# Patient Record
Sex: FEMALE | Race: OTHER | HISPANIC OR LATINO | URBAN - METROPOLITAN AREA
[De-identification: names, ages, dates, MRNs, and addresses within clinical notes are randomized per-mention and may not be internally consistent; named-entity substitution may affect disease eponyms.]

---

## 2021-06-07 ENCOUNTER — EMERGENCY (EMERGENCY)
Facility: HOSPITAL | Age: 86
LOS: 1 days | Discharge: HOME | End: 2021-06-07
Attending: EMERGENCY MEDICINE
Payer: MEDICARE

## 2021-06-07 VITALS
HEART RATE: 64 BPM | TEMPERATURE: 98 F | DIASTOLIC BLOOD PRESSURE: 70 MMHG | SYSTOLIC BLOOD PRESSURE: 144 MMHG | OXYGEN SATURATION: 95 % | RESPIRATION RATE: 18 BRPM

## 2021-06-07 VITALS
OXYGEN SATURATION: 98 % | SYSTOLIC BLOOD PRESSURE: 156 MMHG | DIASTOLIC BLOOD PRESSURE: 82 MMHG | TEMPERATURE: 97 F | RESPIRATION RATE: 15 BRPM | HEART RATE: 66 BPM

## 2021-06-07 LAB
ALBUMIN SERPL ELPH-MCNC: 3.9 G/DL — SIGNIFICANT CHANGE UP (ref 3.3–5.2)
ALP SERPL-CCNC: 65 U/L — SIGNIFICANT CHANGE UP (ref 40–120)
ALT FLD-CCNC: 14 U/L — SIGNIFICANT CHANGE UP
ANION GAP SERPL CALC-SCNC: 8 MMOL/L — SIGNIFICANT CHANGE UP (ref 5–17)
APPEARANCE UR: ABNORMAL
AST SERPL-CCNC: 20 U/L — SIGNIFICANT CHANGE UP
BACTERIA # UR AUTO: ABNORMAL
BASOPHILS # BLD AUTO: 0.01 K/UL — SIGNIFICANT CHANGE UP (ref 0–0.2)
BASOPHILS NFR BLD AUTO: 0.2 % — SIGNIFICANT CHANGE UP (ref 0–2)
BILIRUB SERPL-MCNC: 0.5 MG/DL — SIGNIFICANT CHANGE UP (ref 0.4–2)
BILIRUB UR-MCNC: NEGATIVE — SIGNIFICANT CHANGE UP
BUN SERPL-MCNC: 25.1 MG/DL — HIGH (ref 8–20)
CALCIUM SERPL-MCNC: 9.2 MG/DL — SIGNIFICANT CHANGE UP (ref 8.6–10.2)
CHLORIDE SERPL-SCNC: 106 MMOL/L — SIGNIFICANT CHANGE UP (ref 98–107)
CO2 SERPL-SCNC: 27 MMOL/L — SIGNIFICANT CHANGE UP (ref 22–29)
COLOR SPEC: ABNORMAL
CREAT SERPL-MCNC: 0.46 MG/DL — LOW (ref 0.5–1.3)
DIFF PNL FLD: ABNORMAL
EOSINOPHIL # BLD AUTO: 0 K/UL — SIGNIFICANT CHANGE UP (ref 0–0.5)
EOSINOPHIL NFR BLD AUTO: 0 % — SIGNIFICANT CHANGE UP (ref 0–6)
EPI CELLS # UR: SIGNIFICANT CHANGE UP
GLUCOSE SERPL-MCNC: 120 MG/DL — HIGH (ref 70–99)
GLUCOSE UR QL: NEGATIVE — SIGNIFICANT CHANGE UP
HCT VFR BLD CALC: 28.3 % — LOW (ref 34.5–45)
HCT VFR BLD CALC: 29.3 % — LOW (ref 34.5–45)
HGB BLD-MCNC: 8.4 G/DL — LOW (ref 11.5–15.5)
HGB BLD-MCNC: 8.8 G/DL — LOW (ref 11.5–15.5)
IMM GRANULOCYTES NFR BLD AUTO: 0.3 % — SIGNIFICANT CHANGE UP (ref 0–1.5)
KETONES UR-MCNC: ABNORMAL
LEUKOCYTE ESTERASE UR-ACNC: ABNORMAL
LYMPHOCYTES # BLD AUTO: 0.63 K/UL — LOW (ref 1–3.3)
LYMPHOCYTES # BLD AUTO: 10.8 % — LOW (ref 13–44)
MCHC RBC-ENTMCNC: 26.8 PG — LOW (ref 27–34)
MCHC RBC-ENTMCNC: 27.2 PG — SIGNIFICANT CHANGE UP (ref 27–34)
MCHC RBC-ENTMCNC: 29.7 GM/DL — LOW (ref 32–36)
MCHC RBC-ENTMCNC: 30 GM/DL — LOW (ref 32–36)
MCV RBC AUTO: 90.4 FL — SIGNIFICANT CHANGE UP (ref 80–100)
MCV RBC AUTO: 90.4 FL — SIGNIFICANT CHANGE UP (ref 80–100)
MONOCYTES # BLD AUTO: 0.69 K/UL — SIGNIFICANT CHANGE UP (ref 0–0.9)
MONOCYTES NFR BLD AUTO: 11.9 % — SIGNIFICANT CHANGE UP (ref 2–14)
NEUTROPHILS # BLD AUTO: 4.46 K/UL — SIGNIFICANT CHANGE UP (ref 1.8–7.4)
NEUTROPHILS NFR BLD AUTO: 76.8 % — SIGNIFICANT CHANGE UP (ref 43–77)
NITRITE UR-MCNC: POSITIVE
OB PNL STL: NEGATIVE — SIGNIFICANT CHANGE UP
PH UR: 6.5 — SIGNIFICANT CHANGE UP (ref 5–8)
PLATELET # BLD AUTO: 191 K/UL — SIGNIFICANT CHANGE UP (ref 150–400)
PLATELET # BLD AUTO: 223 K/UL — SIGNIFICANT CHANGE UP (ref 150–400)
POTASSIUM SERPL-MCNC: 4.3 MMOL/L — SIGNIFICANT CHANGE UP (ref 3.5–5.3)
POTASSIUM SERPL-SCNC: 4.3 MMOL/L — SIGNIFICANT CHANGE UP (ref 3.5–5.3)
PROT SERPL-MCNC: 6.4 G/DL — LOW (ref 6.6–8.7)
PROT UR-MCNC: 500 MG/DL
RBC # BLD: 3.13 M/UL — LOW (ref 3.8–5.2)
RBC # BLD: 3.24 M/UL — LOW (ref 3.8–5.2)
RBC # FLD: 14.6 % — HIGH (ref 10.3–14.5)
RBC # FLD: 14.7 % — HIGH (ref 10.3–14.5)
RBC CASTS # UR COMP ASSIST: SIGNIFICANT CHANGE UP /HPF (ref 0–4)
SODIUM SERPL-SCNC: 141 MMOL/L — SIGNIFICANT CHANGE UP (ref 135–145)
SP GR SPEC: 1.01 — SIGNIFICANT CHANGE UP (ref 1.01–1.02)
UROBILINOGEN FLD QL: NEGATIVE — SIGNIFICANT CHANGE UP
WBC # BLD: 5.53 K/UL — SIGNIFICANT CHANGE UP (ref 3.8–10.5)
WBC # BLD: 5.81 K/UL — SIGNIFICANT CHANGE UP (ref 3.8–10.5)
WBC # FLD AUTO: 5.53 K/UL — SIGNIFICANT CHANGE UP (ref 3.8–10.5)
WBC # FLD AUTO: 5.81 K/UL — SIGNIFICANT CHANGE UP (ref 3.8–10.5)
WBC UR QL: ABNORMAL

## 2021-06-07 PROCEDURE — 99284 EMERGENCY DEPT VISIT MOD MDM: CPT | Mod: 25

## 2021-06-07 PROCEDURE — 85027 COMPLETE CBC AUTOMATED: CPT

## 2021-06-07 PROCEDURE — 81001 URINALYSIS AUTO W/SCOPE: CPT

## 2021-06-07 PROCEDURE — 87086 URINE CULTURE/COLONY COUNT: CPT

## 2021-06-07 PROCEDURE — 96374 THER/PROPH/DIAG INJ IV PUSH: CPT | Mod: XU

## 2021-06-07 PROCEDURE — G1004: CPT

## 2021-06-07 PROCEDURE — 36415 COLL VENOUS BLD VENIPUNCTURE: CPT

## 2021-06-07 PROCEDURE — 99284 EMERGENCY DEPT VISIT MOD MDM: CPT

## 2021-06-07 PROCEDURE — 80053 COMPREHEN METABOLIC PANEL: CPT

## 2021-06-07 PROCEDURE — 87186 SC STD MICRODIL/AGAR DIL: CPT

## 2021-06-07 PROCEDURE — 85025 COMPLETE CBC W/AUTO DIFF WBC: CPT

## 2021-06-07 PROCEDURE — 74177 CT ABD & PELVIS W/CONTRAST: CPT

## 2021-06-07 PROCEDURE — 82272 OCCULT BLD FECES 1-3 TESTS: CPT

## 2021-06-07 PROCEDURE — 74177 CT ABD & PELVIS W/CONTRAST: CPT | Mod: 26,ME

## 2021-06-07 RX ORDER — CEFTRIAXONE 500 MG/1
1000 INJECTION, POWDER, FOR SOLUTION INTRAMUSCULAR; INTRAVENOUS ONCE
Refills: 0 | Status: COMPLETED | OUTPATIENT
Start: 2021-06-07 | End: 2021-06-07

## 2021-06-07 RX ADMIN — CEFTRIAXONE 100 MILLIGRAM(S): 500 INJECTION, POWDER, FOR SOLUTION INTRAMUSCULAR; INTRAVENOUS at 12:35

## 2021-06-07 NOTE — ED PROVIDER NOTE - CLINICAL SUMMARY MEDICAL DECISION MAKING FREE TEXT BOX
89 y/o F presents for dysuria and hematuria x 1.5 days, afebrile, no CVAT, non-tender abdomen. Will check labs, UA/culture and reassess, ABx as necessary. If no UTI, will consider imaging for vaginal bleeding.

## 2021-06-07 NOTE — ED ADULT NURSE REASSESSMENT NOTE - NS ED NURSE REASSESS COMMENT FT1
Pt is alert and oriented. Pt denies sob, chest pain, nausea, vomiting and dizziness. Pt has some blood in her depends. Pt resp are even and unlabored, skin color nicki for race. pt educated on plan of care, pt able to successfully teach back plan of care to RN, RN will continue to reeducate pt during hospital stay.

## 2021-06-07 NOTE — ED PROVIDER NOTE - CARE PROVIDER_API CALL
Amish Guillen (DO)  Surgery  [address] [place] [address]  Phone: [phone]  Fax: [phone]  Follow Up Time: Urgent

## 2021-06-07 NOTE — ED PROVIDER NOTE - PATIENT PORTAL LINK FT
You can access the FollowMyHealth Patient Portal offered by [facility] by registering at the following website: [email or web address]. By joining [employer]’s FollowMyHealth portal, you will also be able to view your health information using other applications (apps) compatible with our system.

## 2021-06-07 NOTE — ED PROVIDER NOTE - CARE PLAN
Principal Discharge DX:	Acute cystitis with hematuria  Secondary Diagnosis:	Iron deficiency anemia due to chronic blood loss

## 2021-06-07 NOTE — ED PROVIDER NOTE - OBJECTIVE STATEMENT
89 y/o F presents complaining of 1.5 days of blood in urine and dysuria. She denies fevers, chills, back pain, urinary frequency or urgency. She does not take any blood thinners. She denies vaginal bleeding except when she is urinating. She notes mild decreased appetite. She notes chronic LE edema for which she takes a diuretic without improvement. She has never had anything like this before. She denies blood in stool. She lives with her . She denies allergies to medications.

## 2021-06-07 NOTE — ED PROVIDER NOTE - PHYSICAL EXAMINATION
Const: Awake, alert and oriented. In no acute distress. Well appearing.  HEENT: NC/AT. Moist mucous membranes.  Eyes: No scleral icterus. EOMI.  Neck:. Soft and supple. Full ROM without pain.  Cardiac: Regular rate and regular rhythm. +S1/S2. + murmur. Peripheral pulses 2+ and symmetric. 3+ b/l LE edema to the midthigh.  Resp: Speaking in full sentences. No evidence of respiratory distress. No wheezes, rales or rhonchi.  Abd: Soft, non-tender, non-distended. Normal bowel sounds in all 4 quadrants. No guarding or rebound.  : Normal external female genitalia, feces in groin area, no active vaginal bleeding at this time.  Back: Spine midline and non-tender. No CVAT.  Skin: No rashes, abrasions or lacerations.  Neuro: Awake, alert & oriented x 3. Moves all extremities symmetrically.

## 2021-06-07 NOTE — ED PROVIDER NOTE - PROGRESS NOTE DETAILS
Renetta: I discussed with son Irineo who notes that the patient has been complaining of weakness, but does not have a history of anemia. Son wants the patient to come home as quickly as possible. Recommends sending ABx to [facility] in [place]. I discussed the importance of follow up with PMD in 48 hours, and patient agrees. Citarrella: Patient noted to have john hematuria and now be mildly hypotensive. Citarrella: Patient noted to have john hematuria and now be mildly hypotensive. Patient's other son at bedside states that last week patient went to PMD who ordered blood work as medical clearance in preparation for receiving the Covid vaccine and she was found to be anemic and started on PO iron (3 days ago), but has no history of anemia. He denies that she looks pale. He is aware of need for repeat CBC and CT scan. Renetta: Patient CT results noted. Shared results with son and patient. Recommended urology follow up and discussed strict return precautions. Citarrella: Patient noted to have continued hematuria and now be mildly hypotensive. Patient's other son at bedside states that last week patient went to PMD who ordered blood work as medical clearance in preparation for receiving the Covid vaccine and she was found to be anemic and started on PO iron (3 days ago), but has no history of anemia. He denies that she looks pale. He is aware of need for repeat CBC and CT scan.

## 2021-06-07 NOTE — ED ADULT TRIAGE NOTE - CHIEF COMPLAINT QUOTE
pt a+ox3, BIBA from home c/o vaginal bleeding and pain while urinating x2 days. pt denies any abd pain, n/v/d, fever, chills.

## 2021-06-09 RX ORDER — CEPHALEXIN 500 MG
1 CAPSULE ORAL
Qty: 14 | Refills: 0
Start: 2021-06-09 | End: 2021-06-15

## 2021-09-30 ENCOUNTER — APPOINTMENT (OUTPATIENT)
Dept: VASCULAR SURGERY | Facility: CLINIC | Age: 86
End: 2021-09-30
Payer: MEDICARE

## 2021-09-30 VITALS
OXYGEN SATURATION: 96 % | TEMPERATURE: 97.4 F | HEART RATE: 65 BPM | SYSTOLIC BLOOD PRESSURE: 112 MMHG | DIASTOLIC BLOOD PRESSURE: 66 MMHG

## 2021-09-30 DIAGNOSIS — I87.2 VENOUS INSUFFICIENCY (CHRONIC) (PERIPHERAL): ICD-10-CM

## 2021-09-30 DIAGNOSIS — M79.89 OTHER SPECIFIED SOFT TISSUE DISORDERS: ICD-10-CM

## 2021-09-30 PROCEDURE — 99203 OFFICE O/P NEW LOW 30 MIN: CPT

## 2021-09-30 PROCEDURE — 93970 EXTREMITY STUDY: CPT

## 2021-09-30 NOTE — HISTORY OF PRESENT ILLNESS
[FreeTextEntry1] : 89 year old female presents for evaluation of bilateral lower extremity swelling.\par She reports that over the last 2 years, both of her legs have become increasingly swollen and occasionally weep. Symptoms worsened last month but have improved since her PCP stated her on diuretics.\par Patient denies previous DVTs. No fever or chills\par Of note, she is unable to walk because of a previous stroke.

## 2021-09-30 NOTE — PHYSICAL EXAM
[Normal Breath Sounds] : Normal breath sounds [2+] : left 2+ [Ankle Swelling (On Exam)] : present [] : present [Ankle Swelling On The Left] : moderate [No Rash or Lesion] : No rash or lesion [Alert] : alert [Oriented to Person] : oriented to person [Oriented to Place] : oriented to place [Calm] : calm [JVD] : no jugular venous distention  [Varicose Veins Of Lower Extremities] : not present [Abdomen Masses] : No abdominal masses [Abdomen Tenderness] : ~T ~M No abdominal tenderness [de-identified] : Well appearing [de-identified] : NCAT [FreeTextEntry1] : left leg hyperpigmentation and edema. No lymphorrhea or cellulitis

## 2021-09-30 NOTE — ASSESSMENT
[FreeTextEntry1] : 89 year old female with bilateral lower extremity edema, worse in the left leg.\par Venous ultrasound performed today shows no acute DVT. he has chronic venous changes and deep reflux in both femoral veins suggestive of previous DVT. Furthermore, she has pulsatile venous waveforms suggestive of underlying CHF.\par Her leg swelling is therefore likely due to a combination of deep venous insufficiency and CHF.\par -I have applied bilateral leg Unna boots. Will transition to knee high, 20-30 mmHg compression stockings when edema is less\par -As there is no acute DVT, she does not require therapeutic anticoagulation\par -For her possible CHF, I have recommended a cardiology consultation. Her son informs me that she is scheduled to see a cardiologist\par -RTO 1 week

## 2022-01-01 ENCOUNTER — TRANSCRIPTION ENCOUNTER (OUTPATIENT)
Age: 87
End: 2022-01-01

## 2022-01-01 ENCOUNTER — EMERGENCY (EMERGENCY)
Facility: HOSPITAL | Age: 87
LOS: 1 days | Discharge: HOME | End: 2022-01-01
Attending: STUDENT IN AN ORGANIZED HEALTH CARE EDUCATION/TRAINING PROGRAM
Payer: MEDICARE

## 2022-01-01 ENCOUNTER — INPATIENT (INPATIENT)
Facility: HOSPITAL | Age: 87
LOS: 2 days | Discharge: HOSPICE | DRG: 258 | End: 2022-12-22
Attending: INTERNAL MEDICINE | Admitting: EMERGENCY MEDICINE
Payer: MEDICARE

## 2022-01-01 VITALS
WEIGHT: 130.07 LBS | RESPIRATION RATE: 18 BRPM | OXYGEN SATURATION: 94 % | HEIGHT: 57 IN | HEART RATE: 79 BPM | DIASTOLIC BLOOD PRESSURE: 70 MMHG | TEMPERATURE: 99 F | SYSTOLIC BLOOD PRESSURE: 133 MMHG

## 2022-01-01 VITALS — WEIGHT: 115.08 LBS | HEIGHT: 59 IN

## 2022-01-01 VITALS — TEMPERATURE: 97 F

## 2022-01-01 DIAGNOSIS — I27.20 PULMONARY HYPERTENSION, UNSPECIFIED: ICD-10-CM

## 2022-01-01 DIAGNOSIS — R73.9 HYPERGLYCEMIA, UNSPECIFIED: ICD-10-CM

## 2022-01-01 DIAGNOSIS — I50.9 HEART FAILURE, UNSPECIFIED: ICD-10-CM

## 2022-01-01 DIAGNOSIS — I44.2 ATRIOVENTRICULAR BLOCK, COMPLETE: ICD-10-CM

## 2022-01-01 DIAGNOSIS — Z45.010 ENCOUNTER FOR CHECKING AND TESTING OF CARDIAC PACEMAKER PULSE GENERATOR [BATTERY]: ICD-10-CM

## 2022-01-01 DIAGNOSIS — Z99.3 DEPENDENCE ON WHEELCHAIR: ICD-10-CM

## 2022-01-01 DIAGNOSIS — Z90.710 ACQUIRED ABSENCE OF BOTH CERVIX AND UTERUS: ICD-10-CM

## 2022-01-01 DIAGNOSIS — I69.951 HEMIPLEGIA AND HEMIPARESIS FOLLOWING UNSPECIFIED CEREBROVASCULAR DISEASE AFFECTING RIGHT DOMINANT SIDE: ICD-10-CM

## 2022-01-01 DIAGNOSIS — I50.33 ACUTE ON CHRONIC DIASTOLIC (CONGESTIVE) HEART FAILURE: ICD-10-CM

## 2022-01-01 DIAGNOSIS — R54 AGE-RELATED PHYSICAL DEBILITY: ICD-10-CM

## 2022-01-01 DIAGNOSIS — R62.7 ADULT FAILURE TO THRIVE: ICD-10-CM
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
## 2022-01-01 DIAGNOSIS — Z90.710 ACQUIRED ABSENCE OF BOTH CERVIX AND UTERUS: Chronic | ICD-10-CM

## 2022-01-01 DIAGNOSIS — R00.1 BRADYCARDIA, UNSPECIFIED: ICD-10-CM

## 2022-01-01 DIAGNOSIS — E43 UNSPECIFIED SEVERE PROTEIN-CALORIE MALNUTRITION: ICD-10-CM

## 2022-01-01 DIAGNOSIS — Z66 DO NOT RESUSCITATE: ICD-10-CM

## 2022-01-01 DIAGNOSIS — Z20.822 CONTACT WITH AND (SUSPECTED) EXPOSURE TO COVID-19: ICD-10-CM

## 2022-01-01 DIAGNOSIS — I50.31 ACUTE DIASTOLIC (CONGESTIVE) HEART FAILURE: ICD-10-CM

## 2022-01-01 DIAGNOSIS — I69.920 APHASIA FOLLOWING UNSPECIFIED CEREBROVASCULAR DISEASE: ICD-10-CM

## 2022-01-01 DIAGNOSIS — Z85.42 PERSONAL HISTORY OF MALIGNANT NEOPLASM OF OTHER PARTS OF UTERUS: ICD-10-CM

## 2022-01-01 DIAGNOSIS — E88.09 OTHER DISORDERS OF PLASMA-PROTEIN METABOLISM, NOT ELSEWHERE CLASSIFIED: ICD-10-CM

## 2022-01-01 DIAGNOSIS — J96.01 ACUTE RESPIRATORY FAILURE WITH HYPOXIA: ICD-10-CM

## 2022-01-01 DIAGNOSIS — D53.9 NUTRITIONAL ANEMIA, UNSPECIFIED: ICD-10-CM

## 2022-01-01 DIAGNOSIS — Z71.89 OTHER SPECIFIED COUNSELING: ICD-10-CM

## 2022-01-01 DIAGNOSIS — Z95.0 PRESENCE OF CARDIAC PACEMAKER: ICD-10-CM
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
## 2022-01-01 LAB
ADD ON TEST-SPECIMEN IN LAB: SIGNIFICANT CHANGE UP
ADD ON TEST-SPECIMEN IN LAB: SIGNIFICANT CHANGE UP
ALBUMIN SERPL ELPH-MCNC: 2.6 G/DL — LOW (ref 3.3–5)
ALP SERPL-CCNC: 117 U/L — SIGNIFICANT CHANGE UP (ref 40–120)
ALT FLD-CCNC: 27 U/L — SIGNIFICANT CHANGE UP (ref 12–78)
ANION GAP SERPL CALC-SCNC: 3 MMOL/L — LOW (ref 5–17)
ANION GAP SERPL CALC-SCNC: 3 MMOL/L — LOW (ref 5–17)
ANION GAP SERPL CALC-SCNC: 7 MMOL/L — SIGNIFICANT CHANGE UP (ref 5–17)
ANION GAP SERPL CALC-SCNC: 9 MMOL/L — SIGNIFICANT CHANGE UP (ref 5–17)
APTT BLD: 30.1 SEC — SIGNIFICANT CHANGE UP (ref 27.5–35.5)
APTT BLD: 31.1 SEC — SIGNIFICANT CHANGE UP (ref 27.5–35.5)
AST SERPL-CCNC: 36 U/L — SIGNIFICANT CHANGE UP (ref 15–37)
BASE EXCESS BLDV CALC-SCNC: 4.2 MMOL/L — SIGNIFICANT CHANGE UP
BASOPHILS # BLD AUTO: 0 K/UL — SIGNIFICANT CHANGE UP (ref 0–0.2)
BASOPHILS # BLD AUTO: 0 K/UL — SIGNIFICANT CHANGE UP (ref 0–0.2)
BASOPHILS # BLD AUTO: 0.04 K/UL — SIGNIFICANT CHANGE UP (ref 0–0.2)
BASOPHILS NFR BLD AUTO: 0 % — SIGNIFICANT CHANGE UP (ref 0–2)
BASOPHILS NFR BLD AUTO: 0 % — SIGNIFICANT CHANGE UP (ref 0–2)
BASOPHILS NFR BLD AUTO: 0.5 % — SIGNIFICANT CHANGE UP (ref 0–2)
BILIRUB SERPL-MCNC: 0.9 MG/DL — SIGNIFICANT CHANGE UP (ref 0.2–1.2)
BUN SERPL-MCNC: 26 MG/DL — HIGH (ref 7–23)
BUN SERPL-MCNC: 30 MG/DL — HIGH (ref 7–23)
BUN SERPL-MCNC: 35 MG/DL — HIGH (ref 7–23)
BUN SERPL-MCNC: 43 MG/DL — HIGH (ref 7–23)
CALCIUM SERPL-MCNC: 8.7 MG/DL — SIGNIFICANT CHANGE UP (ref 8.5–10.1)
CALCIUM SERPL-MCNC: 8.7 MG/DL — SIGNIFICANT CHANGE UP (ref 8.5–10.1)
CALCIUM SERPL-MCNC: 9.1 MG/DL — SIGNIFICANT CHANGE UP (ref 8.5–10.1)
CALCIUM SERPL-MCNC: 9.3 MG/DL — SIGNIFICANT CHANGE UP (ref 8.5–10.1)
CHLORIDE SERPL-SCNC: 104 MMOL/L — SIGNIFICANT CHANGE UP (ref 96–108)
CHLORIDE SERPL-SCNC: 105 MMOL/L — SIGNIFICANT CHANGE UP (ref 96–108)
CHLORIDE SERPL-SCNC: 105 MMOL/L — SIGNIFICANT CHANGE UP (ref 96–108)
CHLORIDE SERPL-SCNC: 107 MMOL/L — SIGNIFICANT CHANGE UP (ref 96–108)
CHOLEST SERPL-MCNC: 150 MG/DL — SIGNIFICANT CHANGE UP
CO2 BLDV-SCNC: 33 MMOL/L — HIGH (ref 22–26)
CO2 SERPL-SCNC: 28 MMOL/L — SIGNIFICANT CHANGE UP (ref 22–31)
CO2 SERPL-SCNC: 31 MMOL/L — SIGNIFICANT CHANGE UP (ref 22–31)
CO2 SERPL-SCNC: 32 MMOL/L — HIGH (ref 22–31)
CO2 SERPL-SCNC: 34 MMOL/L — HIGH (ref 22–31)
CREAT SERPL-MCNC: 0.48 MG/DL — LOW (ref 0.5–1.3)
CREAT SERPL-MCNC: 0.63 MG/DL — SIGNIFICANT CHANGE UP (ref 0.5–1.3)
CREAT SERPL-MCNC: 0.78 MG/DL — SIGNIFICANT CHANGE UP (ref 0.5–1.3)
CREAT SERPL-MCNC: 0.81 MG/DL — SIGNIFICANT CHANGE UP (ref 0.5–1.3)
CULTURE RESULTS: SIGNIFICANT CHANGE UP
CULTURE RESULTS: SIGNIFICANT CHANGE UP
EGFR: 66 ML/MIN/1.73M2 — SIGNIFICANT CHANGE UP
EGFR: 69 ML/MIN/1.73M2 — SIGNIFICANT CHANGE UP
EGFR: 81 ML/MIN/1.73M2 — SIGNIFICANT CHANGE UP
EGFR: 86 ML/MIN/1.73M2 — SIGNIFICANT CHANGE UP
EOSINOPHIL # BLD AUTO: 0 K/UL — SIGNIFICANT CHANGE UP (ref 0–0.5)
EOSINOPHIL NFR BLD AUTO: 0 % — SIGNIFICANT CHANGE UP (ref 0–6)
FOLATE SERPL-MCNC: >20 NG/ML — SIGNIFICANT CHANGE UP
GLUCOSE SERPL-MCNC: 111 MG/DL — HIGH (ref 70–99)
GLUCOSE SERPL-MCNC: 115 MG/DL — HIGH (ref 70–99)
GLUCOSE SERPL-MCNC: 126 MG/DL — HIGH (ref 70–99)
GLUCOSE SERPL-MCNC: 86 MG/DL — SIGNIFICANT CHANGE UP (ref 70–99)
HCO3 BLDV-SCNC: 31 MMOL/L — HIGH (ref 22–29)
HCT VFR BLD CALC: 28.6 % — LOW (ref 34.5–45)
HCT VFR BLD CALC: 29.8 % — LOW (ref 34.5–45)
HCT VFR BLD CALC: 30 % — LOW (ref 34.5–45)
HCT VFR BLD CALC: 30.7 % — LOW (ref 34.5–45)
HDLC SERPL-MCNC: 52 MG/DL — SIGNIFICANT CHANGE UP
HGB BLD-MCNC: 8.9 G/DL — LOW (ref 11.5–15.5)
HGB BLD-MCNC: 9 G/DL — LOW (ref 11.5–15.5)
HGB BLD-MCNC: 9.4 G/DL — LOW (ref 11.5–15.5)
HGB BLD-MCNC: 9.7 G/DL — LOW (ref 11.5–15.5)
IMM GRANULOCYTES NFR BLD AUTO: 0.4 % — SIGNIFICANT CHANGE UP (ref 0–0.9)
INR BLD: 1.18 RATIO — HIGH (ref 0.88–1.16)
INR BLD: 1.21 RATIO — HIGH (ref 0.88–1.16)
LACTATE SERPL-SCNC: 1.5 MMOL/L — SIGNIFICANT CHANGE UP (ref 0.7–2)
LIPID PNL WITH DIRECT LDL SERPL: 76 MG/DL — SIGNIFICANT CHANGE UP
LYMPHOCYTES # BLD AUTO: 0.34 K/UL — LOW (ref 1–3.3)
LYMPHOCYTES # BLD AUTO: 0.5 K/UL — LOW (ref 1–3.3)
LYMPHOCYTES # BLD AUTO: 0.67 K/UL — LOW (ref 1–3.3)
LYMPHOCYTES # BLD AUTO: 4.3 % — LOW (ref 13–44)
LYMPHOCYTES # BLD AUTO: 5 % — LOW (ref 13–44)
LYMPHOCYTES # BLD AUTO: 8 % — LOW (ref 13–44)
MAGNESIUM SERPL-MCNC: 2.3 MG/DL — SIGNIFICANT CHANGE UP (ref 1.6–2.6)
MAGNESIUM SERPL-MCNC: 2.4 MG/DL — SIGNIFICANT CHANGE UP (ref 1.6–2.6)
MCHC RBC-ENTMCNC: 30.2 GM/DL — LOW (ref 32–36)
MCHC RBC-ENTMCNC: 31 PG — SIGNIFICANT CHANGE UP (ref 27–34)
MCHC RBC-ENTMCNC: 31.1 GM/DL — LOW (ref 32–36)
MCHC RBC-ENTMCNC: 31.3 GM/DL — LOW (ref 32–36)
MCHC RBC-ENTMCNC: 31.4 PG — SIGNIFICANT CHANGE UP (ref 27–34)
MCHC RBC-ENTMCNC: 31.6 GM/DL — LOW (ref 32–36)
MCHC RBC-ENTMCNC: 31.6 PG — SIGNIFICANT CHANGE UP (ref 27–34)
MCHC RBC-ENTMCNC: 32 PG — SIGNIFICANT CHANGE UP (ref 27–34)
MCV RBC AUTO: 101 FL — HIGH (ref 80–100)
MCV RBC AUTO: 101.1 FL — HIGH (ref 80–100)
MCV RBC AUTO: 101.3 FL — HIGH (ref 80–100)
MCV RBC AUTO: 102.8 FL — HIGH (ref 80–100)
MONOCYTES # BLD AUTO: 0.44 K/UL — SIGNIFICANT CHANGE UP (ref 0–0.9)
MONOCYTES # BLD AUTO: 0.94 K/UL — HIGH (ref 0–0.9)
MONOCYTES # BLD AUTO: 0.99 K/UL — HIGH (ref 0–0.9)
MONOCYTES NFR BLD AUTO: 12.5 % — SIGNIFICANT CHANGE UP (ref 2–14)
MONOCYTES NFR BLD AUTO: 7 % — SIGNIFICANT CHANGE UP (ref 2–14)
MONOCYTES NFR BLD AUTO: 7 % — SIGNIFICANT CHANGE UP (ref 2–14)
NEUTROPHILS # BLD AUTO: 11.42 K/UL — HIGH (ref 1.8–7.4)
NEUTROPHILS # BLD AUTO: 5.29 K/UL — SIGNIFICANT CHANGE UP (ref 1.8–7.4)
NEUTROPHILS # BLD AUTO: 6.52 K/UL — SIGNIFICANT CHANGE UP (ref 1.8–7.4)
NEUTROPHILS NFR BLD AUTO: 74 % — SIGNIFICANT CHANGE UP (ref 43–77)
NEUTROPHILS NFR BLD AUTO: 77 % — SIGNIFICANT CHANGE UP (ref 43–77)
NEUTROPHILS NFR BLD AUTO: 82.3 % — HIGH (ref 43–77)
NON HDL CHOLESTEROL: 98 MG/DL — SIGNIFICANT CHANGE UP
NRBC # BLD: SIGNIFICANT CHANGE UP /100 WBCS (ref 0–0)
NRBC # BLD: SIGNIFICANT CHANGE UP /100 WBCS (ref 0–0)
NT-PROBNP SERPL-SCNC: 6801 PG/ML — HIGH (ref 0–450)
PCO2 BLDV: 55 MMHG — HIGH (ref 39–42)
PH BLDV: 7.36 — SIGNIFICANT CHANGE UP (ref 7.32–7.43)
PHOSPHATE SERPL-MCNC: 3 MG/DL — SIGNIFICANT CHANGE UP (ref 2.5–4.5)
PHOSPHATE SERPL-MCNC: 3.6 MG/DL — SIGNIFICANT CHANGE UP (ref 2.5–4.5)
PLATELET # BLD AUTO: 171 K/UL — SIGNIFICANT CHANGE UP (ref 150–400)
PLATELET # BLD AUTO: 194 K/UL — SIGNIFICANT CHANGE UP (ref 150–400)
PLATELET # BLD AUTO: 204 K/UL — SIGNIFICANT CHANGE UP (ref 150–400)
PLATELET # BLD AUTO: 258 K/UL — SIGNIFICANT CHANGE UP (ref 150–400)
PO2 BLDV: 76 MMHG — SIGNIFICANT CHANGE UP
POTASSIUM SERPL-MCNC: 3.8 MMOL/L — SIGNIFICANT CHANGE UP (ref 3.5–5.3)
POTASSIUM SERPL-MCNC: 3.9 MMOL/L — SIGNIFICANT CHANGE UP (ref 3.5–5.3)
POTASSIUM SERPL-MCNC: 4 MMOL/L — SIGNIFICANT CHANGE UP (ref 3.5–5.3)
POTASSIUM SERPL-MCNC: 4.1 MMOL/L — SIGNIFICANT CHANGE UP (ref 3.5–5.3)
POTASSIUM SERPL-SCNC: 3.8 MMOL/L — SIGNIFICANT CHANGE UP (ref 3.5–5.3)
POTASSIUM SERPL-SCNC: 3.9 MMOL/L — SIGNIFICANT CHANGE UP (ref 3.5–5.3)
POTASSIUM SERPL-SCNC: 4 MMOL/L — SIGNIFICANT CHANGE UP (ref 3.5–5.3)
POTASSIUM SERPL-SCNC: 4.1 MMOL/L — SIGNIFICANT CHANGE UP (ref 3.5–5.3)
PROT SERPL-MCNC: 6.7 GM/DL — SIGNIFICANT CHANGE UP (ref 6–8.3)
PROTHROM AB SERPL-ACNC: 13.7 SEC — HIGH (ref 10.5–13.4)
PROTHROM AB SERPL-ACNC: 14.1 SEC — HIGH (ref 10.5–13.4)
RAPID RVP RESULT: SIGNIFICANT CHANGE UP
RBC # BLD: 2.83 M/UL — LOW (ref 3.8–5.2)
RBC # BLD: 2.9 M/UL — LOW (ref 3.8–5.2)
RBC # BLD: 2.97 M/UL — LOW (ref 3.8–5.2)
RBC # BLD: 3.03 M/UL — LOW (ref 3.8–5.2)
RBC # FLD: 14.2 % — SIGNIFICANT CHANGE UP (ref 10.3–14.5)
RBC # FLD: 14.3 % — SIGNIFICANT CHANGE UP (ref 10.3–14.5)
RBC # FLD: 14.4 % — SIGNIFICANT CHANGE UP (ref 10.3–14.5)
RBC # FLD: 14.5 % — SIGNIFICANT CHANGE UP (ref 10.3–14.5)
SAO2 % BLDV: 97.1 % — SIGNIFICANT CHANGE UP
SARS-COV-2 RNA SPEC QL NAA+PROBE: SIGNIFICANT CHANGE UP
SODIUM SERPL-SCNC: 141 MMOL/L — SIGNIFICANT CHANGE UP (ref 135–145)
SODIUM SERPL-SCNC: 142 MMOL/L — SIGNIFICANT CHANGE UP (ref 135–145)
SODIUM SERPL-SCNC: 142 MMOL/L — SIGNIFICANT CHANGE UP (ref 135–145)
SODIUM SERPL-SCNC: 143 MMOL/L — SIGNIFICANT CHANGE UP (ref 135–145)
SPECIMEN SOURCE: SIGNIFICANT CHANGE UP
SPECIMEN SOURCE: SIGNIFICANT CHANGE UP
TRIGL SERPL-MCNC: 109 MG/DL — SIGNIFICANT CHANGE UP
TROPONIN I, HIGH SENSITIVITY RESULT: 34.91 NG/L — SIGNIFICANT CHANGE UP
TROPONIN I, HIGH SENSITIVITY RESULT: 40.84 NG/L — SIGNIFICANT CHANGE UP
TROPONIN I, HIGH SENSITIVITY RESULT: 51.53 NG/L — SIGNIFICANT CHANGE UP
VIT B12 SERPL-MCNC: >2000 PG/ML — HIGH (ref 232–1245)
WBC # BLD: 13.44 K/UL — HIGH (ref 3.8–10.5)
WBC # BLD: 6.22 K/UL — SIGNIFICANT CHANGE UP (ref 3.8–10.5)
WBC # BLD: 7.92 K/UL — SIGNIFICANT CHANGE UP (ref 3.8–10.5)
WBC # BLD: 9.23 K/UL — SIGNIFICANT CHANGE UP (ref 3.8–10.5)
WBC # FLD AUTO: 13.44 K/UL — HIGH (ref 3.8–10.5)
WBC # FLD AUTO: 6.22 K/UL — SIGNIFICANT CHANGE UP (ref 3.8–10.5)
WBC # FLD AUTO: 7.92 K/UL — SIGNIFICANT CHANGE UP (ref 3.8–10.5)
WBC # FLD AUTO: 9.23 K/UL — SIGNIFICANT CHANGE UP (ref 3.8–10.5)

## 2022-01-01 PROCEDURE — 82607 VITAMIN B-12: CPT

## 2022-01-01 PROCEDURE — 86901 BLOOD TYPING SEROLOGIC RH(D): CPT

## 2022-01-01 PROCEDURE — 92610 EVALUATE SWALLOWING FUNCTION: CPT | Mod: GN

## 2022-01-01 PROCEDURE — 99284 EMERGENCY DEPT VISIT MOD MDM: CPT | Mod: FS

## 2022-01-01 PROCEDURE — 93010 ELECTROCARDIOGRAM REPORT: CPT | Mod: 76

## 2022-01-01 PROCEDURE — 84484 ASSAY OF TROPONIN QUANT: CPT

## 2022-01-01 PROCEDURE — 82746 ASSAY OF FOLIC ACID SERUM: CPT

## 2022-01-01 PROCEDURE — 93971 EXTREMITY STUDY: CPT | Mod: 26,RT

## 2022-01-01 PROCEDURE — 71045 X-RAY EXAM CHEST 1 VIEW: CPT | Mod: 26

## 2022-01-01 PROCEDURE — 99232 SBSQ HOSP IP/OBS MODERATE 35: CPT

## 2022-01-01 PROCEDURE — 93306 TTE W/DOPPLER COMPLETE: CPT

## 2022-01-01 PROCEDURE — 99497 ADVNCD CARE PLAN 30 MIN: CPT | Mod: 25

## 2022-01-01 PROCEDURE — C1785: CPT

## 2022-01-01 PROCEDURE — 73552 X-RAY EXAM OF FEMUR 2/>: CPT

## 2022-01-01 PROCEDURE — 93971 EXTREMITY STUDY: CPT

## 2022-01-01 PROCEDURE — 99222 1ST HOSP IP/OBS MODERATE 55: CPT

## 2022-01-01 PROCEDURE — 99223 1ST HOSP IP/OBS HIGH 75: CPT

## 2022-01-01 PROCEDURE — 85610 PROTHROMBIN TIME: CPT

## 2022-01-01 PROCEDURE — 93010 ELECTROCARDIOGRAM REPORT: CPT

## 2022-01-01 PROCEDURE — 82962 GLUCOSE BLOOD TEST: CPT

## 2022-01-01 PROCEDURE — 86900 BLOOD TYPING SEROLOGIC ABO: CPT

## 2022-01-01 PROCEDURE — 92523 SPEECH SOUND LANG COMPREHEN: CPT | Mod: GN

## 2022-01-01 PROCEDURE — 93005 ELECTROCARDIOGRAM TRACING: CPT

## 2022-01-01 PROCEDURE — 97163 PT EVAL HIGH COMPLEX 45 MIN: CPT | Mod: GP

## 2022-01-01 PROCEDURE — 85730 THROMBOPLASTIN TIME PARTIAL: CPT

## 2022-01-01 PROCEDURE — 73502 X-RAY EXAM HIP UNI 2-3 VIEWS: CPT | Mod: 26,RT

## 2022-01-01 PROCEDURE — 83735 ASSAY OF MAGNESIUM: CPT

## 2022-01-01 PROCEDURE — 85025 COMPLETE CBC W/AUTO DIFF WBC: CPT

## 2022-01-01 PROCEDURE — 99284 EMERGENCY DEPT VISIT MOD MDM: CPT | Mod: 25

## 2022-01-01 PROCEDURE — 84100 ASSAY OF PHOSPHORUS: CPT

## 2022-01-01 PROCEDURE — 84145 PROCALCITONIN (PCT): CPT

## 2022-01-01 PROCEDURE — 97116 GAIT TRAINING THERAPY: CPT | Mod: GP

## 2022-01-01 PROCEDURE — 71045 X-RAY EXAM CHEST 1 VIEW: CPT

## 2022-01-01 PROCEDURE — 80061 LIPID PANEL: CPT

## 2022-01-01 PROCEDURE — 73502 X-RAY EXAM HIP UNI 2-3 VIEWS: CPT

## 2022-01-01 PROCEDURE — 80048 BASIC METABOLIC PNL TOTAL CA: CPT

## 2022-01-01 PROCEDURE — 86850 RBC ANTIBODY SCREEN: CPT

## 2022-01-01 PROCEDURE — 36415 COLL VENOUS BLD VENIPUNCTURE: CPT

## 2022-01-01 PROCEDURE — 85027 COMPLETE CBC AUTOMATED: CPT

## 2022-01-01 PROCEDURE — 99285 EMERGENCY DEPT VISIT HI MDM: CPT

## 2022-01-01 PROCEDURE — 99233 SBSQ HOSP IP/OBS HIGH 50: CPT

## 2022-01-01 PROCEDURE — 73552 X-RAY EXAM OF FEMUR 2/>: CPT | Mod: 26,RT

## 2022-01-01 PROCEDURE — 93306 TTE W/DOPPLER COMPLETE: CPT | Mod: 26

## 2022-01-01 PROCEDURE — 97530 THERAPEUTIC ACTIVITIES: CPT | Mod: GP

## 2022-01-01 RX ORDER — OMEGA-3 ACID ETHYL ESTERS 1 G
1 CAPSULE ORAL
Refills: 0 | Status: DISCONTINUED | OUTPATIENT
Start: 2022-01-01 | End: 2022-01-01

## 2022-01-01 RX ORDER — FERROUS SULFATE 325(65) MG
325 TABLET ORAL DAILY
Refills: 0 | Status: DISCONTINUED | OUTPATIENT
Start: 2022-01-01 | End: 2022-01-01

## 2022-01-01 RX ORDER — CEFAZOLIN SODIUM 1 G
1000 VIAL (EA) INJECTION
Refills: 0 | Status: COMPLETED | OUTPATIENT
Start: 2022-01-01 | End: 2022-01-01

## 2022-01-01 RX ORDER — CEPHALEXIN 500 MG
1 CAPSULE ORAL
Qty: 20 | Refills: 0
Start: 2022-01-01 | End: 2022-01-01

## 2022-01-01 RX ORDER — ASCORBIC ACID 60 MG
1 TABLET,CHEWABLE ORAL
Qty: 0 | Refills: 0 | DISCHARGE

## 2022-01-01 RX ORDER — FUROSEMIDE 40 MG
1 TABLET ORAL
Qty: 30 | Refills: 1
Start: 2022-01-01 | End: 2023-02-19

## 2022-01-01 RX ORDER — MAGNESIUM SULFATE 500 MG/ML
1 VIAL (ML) INJECTION ONCE
Refills: 0 | Status: COMPLETED | OUTPATIENT
Start: 2022-01-01 | End: 2022-01-01

## 2022-01-01 RX ORDER — CALCIUM CARBONATE 500(1250)
1 TABLET ORAL
Qty: 0 | Refills: 0 | DISCHARGE

## 2022-01-01 RX ORDER — ONDANSETRON 8 MG/1
4 TABLET, FILM COATED ORAL EVERY 8 HOURS
Refills: 0 | Status: DISCONTINUED | OUTPATIENT
Start: 2022-01-01 | End: 2022-01-01

## 2022-01-01 RX ORDER — LANOLIN ALCOHOL/MO/W.PET/CERES
1 CREAM (GRAM) TOPICAL
Qty: 0 | Refills: 0 | DISCHARGE
Start: 2022-01-01

## 2022-01-01 RX ORDER — CEPHALEXIN 500 MG
500 CAPSULE ORAL ONCE
Refills: 0 | Status: DISCONTINUED | OUTPATIENT
Start: 2022-01-01 | End: 2022-01-01

## 2022-01-01 RX ORDER — CHOLECALCIFEROL (VITAMIN D3) 125 MCG
1 CAPSULE ORAL
Qty: 0 | Refills: 0 | DISCHARGE

## 2022-01-01 RX ORDER — ACETAMINOPHEN 500 MG
2 TABLET ORAL
Qty: 0 | Refills: 0 | DISCHARGE

## 2022-01-01 RX ORDER — LANOLIN ALCOHOL/MO/W.PET/CERES
3 CREAM (GRAM) TOPICAL AT BEDTIME
Refills: 0 | Status: DISCONTINUED | OUTPATIENT
Start: 2022-01-01 | End: 2022-01-01

## 2022-01-01 RX ORDER — METOPROLOL TARTRATE 50 MG
12.5 TABLET ORAL EVERY 12 HOURS
Refills: 0 | Status: DISCONTINUED | OUTPATIENT
Start: 2022-01-01 | End: 2022-01-01

## 2022-01-01 RX ORDER — ACETAMINOPHEN 500 MG
650 TABLET ORAL EVERY 6 HOURS
Refills: 0 | Status: DISCONTINUED | OUTPATIENT
Start: 2022-01-01 | End: 2022-01-01

## 2022-01-01 RX ORDER — CHOLECALCIFEROL (VITAMIN D3) 125 MCG
1000 CAPSULE ORAL DAILY
Refills: 0 | Status: DISCONTINUED | OUTPATIENT
Start: 2022-01-01 | End: 2022-01-01

## 2022-01-01 RX ORDER — METOPROLOL TARTRATE 50 MG
0.5 TABLET ORAL
Qty: 30 | Refills: 1
Start: 2022-01-01 | End: 2023-02-19

## 2022-01-01 RX ORDER — UBIDECARENONE 100 MG
1 CAPSULE ORAL
Qty: 0 | Refills: 0 | DISCHARGE

## 2022-01-01 RX ORDER — FUROSEMIDE 40 MG
40 TABLET ORAL ONCE
Refills: 0 | Status: COMPLETED | OUTPATIENT
Start: 2022-01-01 | End: 2022-01-01

## 2022-01-01 RX ORDER — CEFAZOLIN SODIUM 1 G
1000 VIAL (EA) INJECTION EVERY 8 HOURS
Refills: 0 | Status: DISCONTINUED | OUTPATIENT
Start: 2022-01-01 | End: 2022-01-01

## 2022-01-01 RX ORDER — VITAMIN E 100 UNIT
1 CAPSULE ORAL
Qty: 0 | Refills: 0 | DISCHARGE

## 2022-01-01 RX ORDER — OMEGA-3 ACID ETHYL ESTERS 1 G
1 CAPSULE ORAL
Qty: 0 | Refills: 0 | DISCHARGE

## 2022-01-01 RX ORDER — ASPIRIN/CALCIUM CARB/MAGNESIUM 324 MG
81 TABLET ORAL ONCE
Refills: 0 | Status: COMPLETED | OUTPATIENT
Start: 2022-01-01 | End: 2022-01-01

## 2022-01-01 RX ORDER — ASCORBIC ACID 60 MG
500 TABLET,CHEWABLE ORAL DAILY
Refills: 0 | Status: DISCONTINUED | OUTPATIENT
Start: 2022-01-01 | End: 2022-01-01

## 2022-01-01 RX ORDER — CEFAZOLIN SODIUM 1 G
2000 VIAL (EA) INJECTION ONCE
Refills: 0 | Status: COMPLETED | OUTPATIENT
Start: 2022-01-01 | End: 2022-01-01

## 2022-01-01 RX ORDER — FUROSEMIDE 40 MG
20 TABLET ORAL DAILY
Refills: 0 | Status: DISCONTINUED | OUTPATIENT
Start: 2022-01-01 | End: 2022-01-01

## 2022-01-01 RX ORDER — FERROUS SULFATE 325(65) MG
1 TABLET ORAL
Qty: 0 | Refills: 0 | DISCHARGE

## 2022-01-01 RX ORDER — ENOXAPARIN SODIUM 100 MG/ML
40 INJECTION SUBCUTANEOUS EVERY 24 HOURS
Refills: 0 | Status: DISCONTINUED | OUTPATIENT
Start: 2022-01-01 | End: 2022-01-01

## 2022-01-01 RX ORDER — CALCIUM CARBONATE 500(1250)
1 TABLET ORAL DAILY
Refills: 0 | Status: DISCONTINUED | OUTPATIENT
Start: 2022-01-01 | End: 2022-01-01

## 2022-01-01 RX ORDER — MORPHINE SULFATE 50 MG/1
1 CAPSULE, EXTENDED RELEASE ORAL EVERY 6 HOURS
Refills: 0 | Status: DISCONTINUED | OUTPATIENT
Start: 2022-01-01 | End: 2022-01-01

## 2022-01-01 RX ORDER — POTASSIUM CHLORIDE 20 MEQ
20 PACKET (EA) ORAL ONCE
Refills: 0 | Status: COMPLETED | OUTPATIENT
Start: 2022-01-01 | End: 2022-01-01

## 2022-01-01 RX ADMIN — ENOXAPARIN SODIUM 40 MILLIGRAM(S): 100 INJECTION SUBCUTANEOUS at 05:41

## 2022-01-01 RX ADMIN — Medication 1000 UNIT(S): at 11:31

## 2022-01-01 RX ADMIN — Medication 1 TABLET(S): at 11:30

## 2022-01-01 RX ADMIN — Medication 500 MILLIGRAM(S): at 10:26

## 2022-01-01 RX ADMIN — Medication 12.5 MILLIGRAM(S): at 10:26

## 2022-01-01 RX ADMIN — Medication 500 MILLIGRAM(S): at 11:30

## 2022-01-01 RX ADMIN — Medication 40 MILLIGRAM(S): at 16:30

## 2022-01-01 RX ADMIN — Medication 1000 UNIT(S): at 12:22

## 2022-01-01 RX ADMIN — Medication 20 MILLIGRAM(S): at 11:30

## 2022-01-01 RX ADMIN — Medication 325 MILLIGRAM(S): at 12:23

## 2022-01-01 RX ADMIN — Medication 1 GRAM(S): at 11:30

## 2022-01-01 RX ADMIN — Medication 40 MILLIGRAM(S): at 11:37

## 2022-01-01 RX ADMIN — Medication 325 MILLIGRAM(S): at 11:31

## 2022-01-01 RX ADMIN — Medication 81 MILLIGRAM(S): at 17:59

## 2022-01-01 RX ADMIN — Medication 1 TABLET(S): at 10:26

## 2022-01-01 RX ADMIN — Medication 1000 MILLIGRAM(S): at 03:22

## 2022-01-01 RX ADMIN — Medication 325 MILLIGRAM(S): at 10:27

## 2022-01-01 RX ADMIN — Medication 20 MILLIEQUIVALENT(S): at 22:23

## 2022-01-01 RX ADMIN — Medication 1 TABLET(S): at 12:22

## 2022-01-01 RX ADMIN — Medication 40 MILLIGRAM(S): at 18:45

## 2022-01-01 RX ADMIN — Medication 12.5 MILLIGRAM(S): at 11:29

## 2022-01-01 RX ADMIN — Medication 500 MILLIGRAM(S): at 12:22

## 2022-01-01 RX ADMIN — Medication 1000 UNIT(S): at 10:27

## 2022-01-01 RX ADMIN — Medication 1 GRAM(S): at 12:22

## 2022-01-01 RX ADMIN — Medication 100 GRAM(S): at 18:46

## 2022-01-01 RX ADMIN — Medication 1000 MILLIGRAM(S): at 10:26

## 2022-03-31 NOTE — ED PROVIDER NOTE - NS ED ROS FT
ROS: CONTUSIONAL: Denies fever, chills, fatigue, wt loss. HEAD: Denies trauma, HA, Dizziness. EYE: Denies Acute visual changes, diplopia. ENMT: Denies change in hearing, tinnitus, epistaxis, difficulty swallowing, sore throat. CARDIO: Denies CP, palpitations, edema. RESP: Denies Cough, SOB , Diff breathing, hemoptysis. GI: Denies N/V, ABD pain, change in bowel movement. URINARY: Denies difficulty urinating, pelvic pain. MS: leg ttp   Denies back pain, weakness, decreased ROM, swelling. NEURO: Denies change in gait, seizures, loss of sensation, dizziness, confusion LOC.  PSY: NO SI/HI. SKIN: +bruising  Denies Rash,

## 2022-03-31 NOTE — ED PROVIDER NOTE - NS ED ATTENDING STATEMENT MOD
This was a shared visit with the LEW. I reviewed and verified the documentation and independently performed the documented:

## 2022-03-31 NOTE — ED PROVIDER NOTE - ATTENDING CONTRIBUTION TO CARE
89 yo female sent in from her primary care doctor's office for pain to the posterior distal thigh that began a couple of days. Patient developed a hematoma s/p fall two weeks ago when her  was helping her transfer. Patient doing ok, however noted increase discomfort the past couple of days. No new trauma. I personally saw the patient with the PA, and completed the key components of the history and physical exam. I then discussed the management plan with the PA.

## 2022-03-31 NOTE — ED PROVIDER NOTE - PATIENT PORTAL LINK FT
You can access the FollowMyHealth Patient Portal offered by [facility] by registering at the following website: [email or web address]. By joining [employer]’s FollowMyHealth portal, you will also be able to view your health information using other applications (apps) compatible with our system.

## 2022-03-31 NOTE — ED PROVIDER NOTE - OBJECTIVE STATEMENT
PT with SPMHX of CVA, bed and wheelchair bound presents to the ED with complaint of large swollen area on the inner part of her Rt thigh. Pt accompanied by son who states that she fell out of bed 2 wk ago and was put back into bed and had a gradual onset of swelling over the RT leg. Pt states that she now has very swollen area that is tender and bruising and pain down the back of her leg. Pt states that pain fells like moderate soreness that is non radiating, constat, made worse with activity, improved by nothing. Dines fever, chills, weakness, numbness, tingling HA, dizziness, SOB, diff breathing.

## 2022-03-31 NOTE — ED PROVIDER NOTE - CLINICAL SUMMARY MEDICAL DECISION MAKING FREE TEXT BOX
PT with stable VS, no acute distress, non toxic appearing, tolerating PO in the ED, PT with no s/s of local infection, no s/s of expanding hematoma, no DVT, no acute FX, pt at baseline, Pt with large hematoma that will be tx supportively, cover with ABx to avoid superficial cellulites due to sensitivity of area, follow up to PCP, educated about when to return to the ED if needed. PT verbalizes that he understands all instructions and results. Pt informed that ED is open and available 24/7 365 days a yr, encouraged to return to the ED if they have any change in condition, or feel the need for revaluation.

## 2022-03-31 NOTE — ED PROVIDER NOTE - NSFOLLOWUPINSTRUCTIONS_ED_ALL_ED_FT
1) Follow up with your doctor in one week  2) Return to the ER for worsening or concerning symptoms  3) Take medication as prescribed      Hematoma    WHAT YOU NEED TO KNOW:    A hematoma is a collection of blood. A bruise is a type of hematoma. A hematoma may form in a muscle or in the tissues just under the skin. A hematoma that forms under the skin will feel like a bump or hard mass. Hematomas can happen anywhere in your body, including in your brain. Your body may break down and absorb a mild hematoma on its own. A more serious hematoma may need treatment.     DISCHARGE INSTRUCTIONS:    Medicines: You may need any of the following:   •Prescription pain medicine may be given. Ask how to take this medicine safely.      •NSAIDs, such as ibuprofen, help decrease swelling, pain, and fever. This medicine is available with or without a doctor's order. NSAIDs can cause stomach bleeding or kidney problems in certain people. If you take blood thinner medicine, always ask your healthcare provider if NSAIDs are safe for you. Always read the medicine label and follow directions.      •Antibiotics prevent or treat a bacterial infection.      •Take your medicine as directed. Contact your healthcare provider if you think your medicine is not helping or if you have side effects. Tell him of her if you are allergic to any medicine. Keep a list of the medicines, vitamins, and herbs you take. Include the amounts, and when and why you take them. Bring the list or the pill bottles to follow-up visits. Carry your medicine list with you in case of an emergency.      Return to the emergency department if:   •You have new or worsening pain, or pain that does not get better with medicine.      •You have a fever.      •You have trouble moving the body part that has the hematoma.      Contact your healthcare provider if:   •You have questions or concerns about your condition or care.          Follow up with your healthcare provider as directed: You may need to have surgery if your hematoma is severe. You may also need other tests to make sure there is no other damage that needs to be treated. Write down your questions so you remember to ask them during your visits.    Self-care:   •Rest the area. Rest will help your body heal and will also help prevent more damage.      •Apply ice as directed. Ice helps reduce swelling. Ice may also help prevent tissue damage. Use an ice pack, or put crushed ice in a bag. Cover it with a towel. Place it on your hematoma for 20 minutes every hour, or as directed. Ask how many times each day to apply ice, and for how many days.      •Compress the injury if possible. Lightly wrap the injury with an elastic or soft bandage. This may help control swelling. Ask your healthcare provider how to wrap your injury properly.       •Elevate the area as directed. If possible, raise the area above the level of your heart as often as you can. This will help decrease swelling.       •Keep the hematoma covered with a bandage. This will help protect the area while it heals.            Hematoma    LO QUE NECESITA SABER:    Un hematoma es elen acumulación de nata. Un moretón es un tipo de hematoma. Un hematoma puede llegar a formarse en un músculo o en los tejidos dorene por debajo de la piel. Un hematoma que se forma debajo de la piel se sentirá giovanna un bulto o elen masa dura. Los hematomas pueden ocurrir en cualquier parte del cuerpo, incluso en el cerebro. Green organismo puede destruir y absorber un hematoma leve por sí solo. Un hematoma más grave puede necesitar tratamiento.    INSTRUCCIONES SOBRE EL KIERSTEN HOSPITALARIA:    Medicamentos:Es posible que usted necesite alguno de los siguientes:   •Los analgésicos recetadospodrían administrarse. Pregunte cómo penelope estos medicamentos de elen forma serrano.      •Los JETT,giovanna el ibuprofeno, ayudan a disminuir la inflamación, el dolor y la fiebre. Laci medicamento está disponible con o sin elen receta médica. Los JETT pueden causar sangrado estomacal o problemas renales en ciertas personas. Si usted armida un medicamento anticoagulante, siempre pregúntele a green médico si los JETT son seguros para usted. Siempre brinda la etiqueta de laci medicamento y siga las instrucciones.      •Los antibióticossirven para prevenir o tratar elen infección bacteriana.      •[place] brown medicamentos giovanna se le haya indicado.Consulte con green médico si usted avis que green medicamento no le está ayudando o si presenta efectos secundarios. Infórmele si es alérgico a algún medicamento. Mantenga elen lista actualizada de los medicamentos, las vitaminas y los productos herbales que armida. Incluya los siguientes datos de los medicamentos: cantidad, frecuencia y motivo de administración. Traiga con usted la lista o los envases de las píldoras a brown citas de seguimiento. Lleve la lista de los medicamentos con usted en jero de elen emergencia.      Regrese a la zbigniew de emergencias si:  •Usted tiene un dolor nuevo, el dolor que sentía antes empeora o no mejora con medicamentos.      •Tiene fiebre.      •Usted tiene dificultad para  la parte de green cuerpo que tiene el hematoma.      Comuníquese con green médico si:  •Usted tiene preguntas o inquietudes acerca de green condición o cuidado.          Acuda a brown consultas de control con green médico según le indicaron:Es probable que usted necesite cirugía si green hematoma es grave. También es probable que usted necesite de otros exámenes para asegurarse de que no haya otros daños que necesiten tratamiento. Anote brown preguntas para que se acuerde de hacerlas aureliano brown visitas.    Cuidados personales:  •Repose la zonaEl descanso le ayudará a green cuerpo a sanar y también ayudará a prevenir más daños.      •Aplique hielo según las indicaciones.El hielo ayuda a reducir la inflamación. El hielo también puede contribuir a evitar el daño de los tejidos. Use elen compresa de hielo o ponga hielo triturado en elen bolsa de plástico. [place]brala con elen toalla. Colóquela en green hematoma por 15 a 20 minutos cada hora, o giovanna se le indique. Pregunte cuántas veces aplicarse hielo al día y por cuántos vasquez.      •Envuelva la lesión si es posible.Envuelva ligeramente la lesión con un vendaje elástico o suave. [place] puede llegar a controlar la inflamación. Pregúntele a green médico cómo envolver la lesión correctamente.      •Eleve el área afectada según indicaciones.Cuando pueda, levante el área afectada de manera que quede elevada por encima del nivel de green corazón, con la frecuencia posible. [place] ayudará a disminuir la hinchazón.      •Mantenga el hematoma cubierto con un vendaje.[place] ayudará a proteger el área mientras jose martin.

## 2022-03-31 NOTE — ED ADULT TRIAGE NOTE - CHIEF COMPLAINT QUOTE
son states mother fell 3 weeks ago, now c/o lump behind the right leg, MD said to go to ER to be evaluated  A&Ox3, resp wnl, + tenderness

## 2022-12-19 PROBLEM — I63.9 CEREBRAL INFARCTION, UNSPECIFIED: Chronic | Status: ACTIVE | Noted: 2022-01-01

## 2022-12-19 NOTE — H&P ADULT - HISTORY OF PRESENT ILLNESS
89 y/o F with PMH uterine cancer s/p MICAHEL, TIA, CVA with residual right sided weakness, UTI, wheel chair dependent presented with shortness of breath. Pt's son was present at the bedside and stated that the pt has not had any strength the last 2 weeks. She started having difficulty getting up on Saturday and was short of breath. Sunday her breathing worsened. She did complain of chest pain and abdominal pain previously but does not have any today. Reports lower extremity swelling but son says her legs are much improved since wearing compression stockings and have been worse in the past. She does have a history of CVA and has residual right sided weakness, sometimes expressive aphasia and eats a pureed diet. She uses an electric wheelchair to get around. Denies fevers, chills,  N/V, diarrhea/constipation.     ER course: 87% on room air in triage -> 100% on 2L nasal cannula. VSS. Labs: Hb 9.7 (baseline ~8.4), .3, neutrophils 82.3%, INR 1.18, glucose 115, albumin 2.6, BNP 6801. VBG: pH 7.36, CO2 55, HCO3 31, COVID and RVP negative. EKG: Wide complex QRS, normal intervals (personally reviewed).     CXR: PPM, increased vascular congestion, left pleural effusion, no consolidation, no pneumothorax (personally reviewed).     Pt was given 81 mg of aspirin, 40 mg IVP lasix. She is being admitted to telemetry for further management.

## 2022-12-19 NOTE — ED PROVIDER NOTE - SKIN, MLM
Skin normal color for race, warm, dry and intact. No evidence of rash. No tactile warmth. +bruising b/l forearms, especially right. +blackened scab on R abdomen, no surrounding evidence of infection.

## 2022-12-19 NOTE — PHARMACOTHERAPY INTERVENTION NOTE - COMMENTS
Medication reconciliation completed.  Patient was unable to provide medication information, spoke to johanna Huizar at bedside and they provided current medication list; confirmed with Dr. [place].  Son states that pt does not take any prescription medication at home, only vitamins and supplements.

## 2022-12-19 NOTE — H&P ADULT - ASSESSMENT
89 y/o F presented with shortness of breath     1. Acute hypoxic respiratory distress secondary to CHF exacerbation/pleural effusion   - Admit to telemetry   - 87% on room air in triage -> 100% on 2L nasal cannula  - c/w supplemental O2 to maintain SpO2 > 92%   - BNP 6801, pt is fluid overloaded on exam   - Ordered ECHO and trend troponins   - s/p 40 mg IVP lasix in the ER, will continue (monitor BP closely)   - Avoid starting beta blocker in acute exacerbation   - Ordered procalcitonin to r/o superimposed bacterial PNA   - Strict I+Os, daily weights   - 2L H20 restriction, 2g sodium restriction      - Keep K > 4 and Mg > 2    - Cardiology consult - Dr. Palla   - EP consult for PPM interrogation - Dr. Ruiz      2. Macrocytic anemia   - Hb 9.7 (baseline ~8.4), .3, monitor   - Ordered B12 and folate     3. Hyperglycemia   - Glucose 115, monitor     4. Hypoalbuminemia   - Albumin 2.6  - Nutrition consult     5. History of Uterine cancer, TIA, CVA with residual right sided weakness, UTI, wheel chair dependent   - c/w home medications; verified with pt at the bedside   - PT/case management/social work consult   - Speech evaluation (son states pt eats pureed foods at home)     DVT ppx: Lovenox 40 mg subcutaneous daily   Code status: The patient is A+Ox2 at the time of my evaluation and is able to state that she would not want chest compressions, intubation or a feeding tube. Her son was present at the bedside and confirmed this as well. MOLST was completed in the ER and I reviewed the documentation as well.   Emergency contact: Bhupendra De La O (son/HCP) [phone]

## 2022-12-19 NOTE — INPATIENT CERTIFICATION FOR MEDICARE PATIENTS - IN ORDER TO MEET MEDICARE REQUIREMENTS.
In order to meet Medicare requirements, the clinical documentation must support the information cited in the admission order.  Please be sure to provide detailed and clear documentation about the following in the admitting note/history and physical:
Showering allowed/No Heavy lifting/straining

## 2022-12-19 NOTE — ED PROVIDER NOTE - CLINICAL SUMMARY MEDICAL DECISION MAKING FREE TEXT BOX
89 y/o  female, Yi-speaking PMH includes CVA with residual R-sided weakness and expressive aphasia brought to ED by son from PCP's office for 3 days SOB, decreased PO intake, +hypoxic in intake 88% room air. No fever, chills, cough. +mild chest pain, +chronic peripheral edema. Plan: EKG, chest X-ray, labs including RVP/COVID swab, blood cultures, lactate, BNP, Troponin, UA with culture, Nasal cannula 1-2L per minute, monitor, re-observe, re-assess. Expect admission. 89 y/o  female, Lao-speaking PMH includes CVA with residual R-sided weakness and expressive aphasia brought to ED by son from PCP's office for 3 days SOB, decreased PO intake, +hypoxic in intake 88% room air. No fever, chills, cough. +mild chest pain, +chronic peripheral edema.   Plan: EKG, chest X-ray, labs including RVP/COVID swab, blood cultures, lactate, BNP, Troponin, UA with culture, Nasal cannula 1-2L per minute, monitor, re-observe, re-assess. Expect admission.  Addendum: CXR + CHF, BNP 6800 with 1st Troponin in high-normal range, WBC normal, no fever.  Dx: acute CHF.  Plan: IV lasix, Tele admission.

## 2022-12-19 NOTE — ED ADULT NURSE NOTE - NSIMPLEMENTINTERV_GEN_ALL_ED
Implemented All Fall with Harm Risk Interventions:  [place] to call system. Call bell, personal items and telephone within reach. Instruct patient to call for assistance. Room bathroom lighting operational. Non-slip footwear when patient is off stretcher. Physically safe environment: no spills, clutter or unnecessary equipment. Stretcher in lowest position, wheels locked, appropriate side rails in place. Provide visual cue, wrist band, yellow gown, etc. Monitor gait and stability. Monitor for mental status changes and reorient to person, place, and time. Review medications for side effects contributing to fall risk. Reinforce activity limits and safety measures with patient and family. Provide visual clues: red socks.

## 2022-12-19 NOTE — H&P ADULT - NSHPSOCIALHISTORY_GEN_ALL_CORE
Lives with her . Has a home health aid that comes 2x per day. Denies smoking, alcohol, drug use. Uses an electric wheelchair to ambulate.

## 2022-12-19 NOTE — ED PROVIDER NOTE - OBJECTIVE STATEMENT
89 y/o Mongolian-speaking female PMHx of uterine ca, TIA, CVA with right sided-weakness, UTI, wheelchair-dependent ambulatory to ED sent from PCP Dr. Hopper c/o 3 days shortness of breath, improved when sitting up in wheelchair. Pt +hypoxic 87-88% on ED arrival room air. Son at bedside also reports pt has had mild chest discomfort x3 days. Son reports pt has had decreased PO intake. Son denies recent fevers. Pt has peripheral edema, not worsening. Pt had 1 J&J covid vaccine 05/2021 but has not received flu vaccine this year. Pt is a never smoker. Son reports no known allergies to medication. Doxycycline Counseling:  Patient counseled regarding possible photosensitivity and increased risk for sunburn.  Patient instructed to avoid sunlight, if possible.  When exposed to sunlight, patients should wear protective clothing, sunglasses, and sunscreen.  The patient was instructed to call the office immediately if the following severe adverse effects occur:  hearing changes, easy bruising/bleeding, severe headache, or vision changes.  The patient verbalized understanding of the proper use and possible adverse effects of doxycycline.  All of the patient's questions and concerns were addressed.

## 2022-12-19 NOTE — H&P ADULT - NSHPPHYSICALEXAM_GEN_ALL_CORE
ICU Vital Signs Last 24 Hrs  T(C): 36.3 (19 Dec 2022 18:39), Max: 36.4 (19 Dec 2022 14:09)  T(F): 97.4 (19 Dec 2022 18:39), Max: 97.5 (19 Dec 2022 14:09)  HR: 72 (19 Dec 2022 18:39) (72 - 78)  BP: 127/63 (19 Dec 2022 18:39) (125/67 - 127/63)  BP(mean): 85 (19 Dec 2022 14:09) (85 - 85)  RR: 20 (19 Dec 2022 18:39) (20 - 20)  SpO2: 100% (19 Dec 2022 18:39) (95% - 100%)    O2 Parameters below as of 19 Dec 2022 18:39  Patient On (Oxygen Delivery Method): nasal cannula  O2 Flow (L/min): 2    General: Awake and alert, cooperative with exam. No acute distress.   Skin: Warm, dry, and pink.   Eyes: Pupils equal and reactive to light. Extraocular eye movements intact. No conjunctival injection, discharge, or scleral icterus.   HEENT: Atraumatic, normocephalic. Moist mucus membranes.  Cardiology: Normal S1, S2. No murmurs, rubs, or gallops. Regular rate and rhythm.   Respiratory: Crackles at the bases bilaterally. Normal chest expansion. No accessory muscle use.   Gastrointestinal: Positive bowel sounds. Soft, non-tender, non-distended. No guarding, rigidity, or rebound tenderness. No hepatosplenomegaly.   Extremities: 2+ peripheral edema bilaterally. Dorsalis pedis pulses 2+ bilaterally.   Neurological: A+Ox2 (person and place). Cranial nerves 2-12 intact. Normal speech. No facial droop. No focal neurological deficits. 4/5 motor strength right upper and lower extremities. 5/5 motor strength left upper and lower extremities. Sensation intact.   Psychiatric: Normal affect. Normal mood.

## 2022-12-19 NOTE — H&P ADULT - NSICDXPASTMEDICALHX_GEN_ALL_CORE_FT
PAST MEDICAL HISTORY:  CVA (cerebrovascular accident)     Urinary tract infection     Uterine cancer

## 2022-12-19 NOTE — ED PROVIDER NOTE - ENMT, MLM
Airway patent, Nasal mucosa clear. Mouth with moist mucous membranes. Throat has no vesicles, no oropharyngeal exudates and uvula is midline. Oropharynx clear.

## 2022-12-19 NOTE — ED PROVIDER NOTE - MUSCULOSKELETAL, MLM
Spine appears normal, no muscle or joint tenderness. B/l Legs 2-3+ pitting edema, non-tender. R leg non-tender.

## 2022-12-19 NOTE — H&P ADULT - NSHPREVIEWOFSYSTEMS_GEN_ALL_CORE
Constitutional: negative for fatigue, negative for fever, negative for chills, negative for decreased appetite, positive for weakness.   Skin: negative for rashes, negative for open wounds, negative for jaundice.   Eyes: negative for blurry vision, negative for double vision.   Ears, nose, throat: negative for ear pain, negative for nasal congestion, negative for sore throat, negative for lymph node swelling.   Cardiovascular: negative for chest pain, negative for palpitations, positive for lower extremity swelling.   Respiratory: positive for shortness of breath, negative for wheezing, negative for cough.   Gastrointestinal: negative for abdominal pain, negative for nausea, negative for vomiting, negative for diarrhea, negative for constipation, negative for blood in the stool, negative for black tarry stools.   Genitourinary: negative for burning on urination, negative for urinary urgency or frequency, negative for blood in the urine.   Endocrine: negative for cold intolerance, negative for heat intolerance, negative for increased thirst.   Hematologic: negative for easy bruising or bleeding.   Musculoskeletal: negative for muscle/joint pain, negative for decreased range of motion.   Neurological: negative for dizziness, negative for headaches, negative for loss of consciousness, negative for motor weakness, negative for sensory deficits.   Psychiatric: negative for depression, negative for anxiety.

## 2022-12-19 NOTE — ED PROVIDER NOTE - NEUROLOGICAL, MLM
Alert and oriented, follows simple commands, no focal deficits, no motor or sensory deficits. Mild expressive aphasia, chronic and R-sided weakness, chronic post-CVA.

## 2022-12-19 NOTE — H&P ADULT - CONVERSATION DETAILS
The patient is A+Ox2 at the time of my evaluation and is able to state that she would not want chest compressions, intubation or a feeding tube. Her son was present at the bedside and confirmed this as well. MOLST was completed in the ER and I reviewed the documentation as well.

## 2022-12-19 NOTE — ED PROVIDER NOTE - CONSTITUTIONAL, MLM
normal... Elderly white Thai-speaking female, awake, alert, and in no apparent distress. No apparent respiratory discomfort, non-toxic.

## 2022-12-20 NOTE — PROGRESS NOTE ADULT - SUBJECTIVE AND OBJECTIVE BOX
History of Present Illness:   89 y/o F with PMH uterine cancer s/p MICHAEL, TIA, CVA with residual right sided weakness, UTI, wheel chair dependent presented with shortness of breath. Pt's son was present at the bedside and stated that the pt has not had any strength the last 2 weeks. She started having difficulty getting up on Saturday and was short of breath. Sunday her breathing worsened. She did complain of chest pain and abdominal pain previously but does not have any today. Reports lower extremity swelling but son says her legs are much improved since wearing compression stockings and have been worse in the past. She does have a history of CVA and has residual right sided weakness, sometimes expressive aphasia and eats a pureed diet. She uses an electric wheelchair to get around. Denies fevers, chills,  N/V, diarrhea/constipation.   -found to have Acute Chf and PPM failure             REVIEW OF SYSTEMS:    CONSTITUTIONAL: No weakness, No fevers or chills  ENT: No ear ache, No sorethroat  NECK: No pain, No stiffness  RESPIRATORY: No cough, No wheezing, No hemoptysis; + dyspnea  CARDIOVASCULAR: No chest pain, No palpitations  GASTROINTESTINAL: No abd pain, No nausea, No vomiting, No hematemesis, No diarrhea or constipation. No melena, No hematochezia.  GENITOURINARY: No dysuria, No  hematuria  NEUROLOGICAL: No diplopia, No paresthesia, No motor dysfunction  MUSCULOSKELETAL: No arthralgia, No myalgia  SKIN: No rashes, or lesions   PSYCH: no anxiety, no suicidal ideation    All other review of systems is negative unless indicated above    Vital Signs Last 24 Hrs  T(C): 36.7 (20 Dec 2022 17:57), Max: 36.9 (20 Dec 2022 08:13)  T(F): 98.1 (20 Dec 2022 17:57), Max: 98.4 (20 Dec 2022 08:13)  HR: 60 (20 Dec 2022 18:17) (37 - 116)  BP: 132/58 (20 Dec 2022 18:17) (92/75 - 132/58)  BP(mean): 77 (20 Dec 2022 15:00) (68 - 86)  RR: 18 (20 Dec 2022 18:17) (17 - 22)  SpO2: 98% (20 Dec 2022 18:17) (95% - 100%)    Parameters below as of 20 Dec 2022 18:17  Patient On (Oxygen Delivery Method): nasal cannula  O2 Flow (L/min): 2      PHYSICAL EXAM:    GENERAL: NAD  HEENT:  NC/AT, EOMI, PERRLA, No scleral icterus, Moist mucous membranes  NECK: Supple, No JVD  CNS:  Alert & Oriented X2, Motor Strength 5/5 B/L upper and lower extremities; DTRs 2+ intact   LUNG: Normal Breath sounds, Clear to auscultation bilaterally, + rales, No rhonchi, No wheezing  HEART: RRR; No murmurs, No rubs  ABDOMEN: +BS, ST/ND/NT  GENITOURINARY: Voiding, Bladder not distended  EXTREMITIES:  2+ Peripheral Pulses, No clubbing, No cyanosis, No tibial edema  MUSCULOSKELTAL: Joints normal ROM, No TTP, No effusion  VAGINAL: deferred  SKIN: no rashes  RECTAL: deferred, not indicated  BREAST: deferred                          8.9    6.22  )-----------( 171      ( 20 Dec 2022 06:50 )             28.6     12-20    141  |  104  |  26<H>  ----------------------------<  86  3.8   |  34<H>  |  0.48<L>    Ca    8.7      20 Dec 2022 06:50  Mg     1.9     12-19    TPro  6.7  /  Alb  2.6<L>  /  TBili  0.9  /  DBili  x   /  AST  36  /  ALT  27  /  AlkPhos  117  12-19    Vancomycin levels:   Cultures:     MEDICATIONS  (STANDING):  ascorbic acid 500 milliGRAM(s) Oral daily  calcium carbonate    500 mG (Tums) Chewable 1 Tablet(s) Chew daily  cholecalciferol 1000 Unit(s) Oral daily  ferrous    sulfate 325 milliGRAM(s) Oral daily  furosemide   Injectable 40 milliGRAM(s) IV Push once  magnesium sulfate  IVPB 1 Gram(s) IV Intermittent once  omega-3-Acid Ethyl Esters 1 Gram(s) Oral <User Schedule>  potassium chloride    Tablet ER 20 milliEquivalent(s) Oral once    MEDICATIONS  (PRN):  acetaminophen     Tablet .. 650 milliGRAM(s) Oral every 6 hours PRN Temp greater or equal to 38C (100.4F), Mild Pain (1 - 3)  aluminum hydroxide/magnesium hydroxide/simethicone Suspension 30 milliLiter(s) Oral every 4 hours PRN Dyspepsia  bismuth subsalicylate Liquid 30 milliLiter(s) Oral four times a day PRN Diarrhea  melatonin 3 milliGRAM(s) Oral at bedtime PRN Insomnia  ondansetron Injectable 4 milliGRAM(s) IV Push every 8 hours PRN Nausea and/or Vomiting      all labs reviewed  all imaging reviewed          Assessment:  	  89 y/o F presented with shortness of breath     1. Acute hypoxic respiratory distress secondary to CHF exacerbation/pleural effusion   - Admit to telemetry   - 87% on room air in triage -> 100% on 2L nasal cannula  - c/w supplemental O2 to maintain SpO2 > 92%   - BNP 6801, pt is fluid overloaded on exam   - s/p 40 mg IVP lasix in the ER, will continue (monitor BP closely)   - Strict I+Os, daily weights   - Keep K > 4 and Mg > 2    - Cardiology consult - Dr. Palla   - EP consult for PPM interrogation - Dr. Ruiz      2. 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

## 2022-12-20 NOTE — PHYSICAL THERAPY INITIAL EVALUATION ADULT - GENERAL OBSERVATIONS, REHAB EVAL
Pt seen for 30min PT bedside Eval. Pt rec'd semi supine in bed in NAD, reports SOB, Ukrainian speaking, declining OOB willing to participate in bedisde eval. History taken, pt ROM WFL, and strength grossly assessed. Pt left semi supine in bed in NAD, all needs met, +bed alarm, RN aware.

## 2022-12-20 NOTE — PATIENT PROFILE ADULT - FUNCTIONAL ASSESSMENT - DAILY ACTIVITY SCORE.
yes 83y male arrived to ED unable to urinate since last night. PMHx BPH, HTN, HLD. Patient endorses suprapubic pain gradually worsening since last night, quickly relieved by Conway insertion performed on arrival. Patient denies any other complaints. A&Ox4, ambulatory, hypertensive in ED VS otherwise stable. 8

## 2022-12-20 NOTE — CONSULT NOTE ADULT - PROBLEM SELECTOR RECOMMENDATION 9
with cough SOB , PVC with elevated BNP , acute  diastolic heart failure ,hypoxia  received IV lasix one dose ,   will give another dose   follow up echo ,

## 2022-12-20 NOTE — ED ADULT NURSE REASSESSMENT NOTE - NS ED NURSE REASSESS COMMENT FT1
Pt A+Ox1 only. Anxious, wants to go home. Pt reassured. VSS. O2 sat on 1L 96%. Pt leaving floor for echo. Monitored closely. RSR 70's. Pt A+Ox2 only. Anxious, wants to go home. Pt reassured. VSS. O2 sat on 1L 96%. Pt leaving floor for echo. Monitored closely. RSR 70's.

## 2022-12-20 NOTE — PROCEDURE NOTE - NSICDXPROCEDURE_GEN_ALL_CORE_FT
PROCEDURES:  Replacement, dual lead permanent pacemaker pulse generator 20-Dec-2022 17:48:58  Aldair Mejia

## 2022-12-20 NOTE — PACU DISCHARGE NOTE - COMMENTS
pt discharged to CICU via stretcher and zoll, report given to Raysa PEARCE , pt awake, family at the bedside, home monitor given to johanna Rodriguez , safety and comfort maintained.

## 2022-12-20 NOTE — CONSULT NOTE ADULT - SUBJECTIVE AND OBJECTIVE BOX
history obtained from chart     CHIEF COMPLAINT: shortness of breath     HPI:  89 y/o F with PMH uterine cancer s/p MICHAEL, TIA, CVA with residual right sided weakness, UTI, wheel chair dependent presented with shortness of breath. Pt's son was present at the bedside and stated that the pt has not had any strength the last 2 weeks. She started having difficulty getting up on Saturday and was short of breath. Sunday her breathing worsened. She did complain of chest pain and abdominal pain previously but does not have any today. Reports lower extremity swelling but son says her legs are much improved since wearing compression stockings and have been worse in the past. She does have a history of CVA and has residual right sided weakness, sometimes expressive aphasia and eats a pureed diet. She uses an electric wheelchair to get around. Denies fevers, chills,  N/V, diarrhea/constipation.     ER course: 87% on room air in triage -> 100% on 2L nasal cannula. VSS. Labs: Hb 9.7 (baseline ~8.4), .3, neutrophils 82.3%, INR 1.18, glucose 115, albumin 2.6, BNP 6801. VBG: pH 7.36, CO2 55, HCO3 31, COVID and RVP negative. EKG: Wide complex QRS, normal intervals (personally reviewed).     CXR: PPM, increased vascular congestion, left pleural effusion, no consolidation, no pneumothorax (personally reviewed).     Pt was given 81 mg of aspirin, 40 mg IVP lasix. She is being admitted to telemetry for further management.  Patient  monitor showed pause >4 seconds ,  patient currently comfortable       PAST MEDICAL & SURGICAL HISTORY:  CVA (cerebrovascular accident)      Uterine cancer      Urinary tract infection      S/P hysterectomy          Allergies    No Known Allergies    Intolerances      Social History:  Lives with her . Has a home health aid that comes 2x per day. Denies smoking, alcohol, drug use. Uses an electric wheelchair to ambulate. (19 Dec 2022 20:29)      FAMILY HISTORY:  FH: diabetes mellitus (Father)        MEDICATIONS:Home Medications:  biotin: 1 cap(s) orally once a day (19 Dec 2022 20:06)  calcium carbonate: 1 tab(s) orally once a day (19 Dec 2022 20:06)  Co-Q10: 1 cap(s) orally once a day (19 Dec 2022 20:06)  Cranberry oral capsule: 3 cap(s) orally once a day  ***pt son says that pt is prone to UTI&#x27;s and he gives 3 separate Cranberry supplements*** (19 Dec 2022 20:06)  ferrous sulfate: 1 tab(s) orally once a day (19 Dec 2022 20:06)  Fish Oil oral capsule: 1 cap(s) orally every other day (19 Dec 2022 20:06)  krill oil oral supplement: 1 cap(s) orally every other day (19 Dec 2022 20:06)  Pepto-Bismol 262 mg/15 mL oral suspension: 30 milliliter(s) orally 4 times a day, As Needed (19 Dec 2022 20:06)  Tylenol Extra Strength 500 mg oral tablet: 2 tab(s) orally every 6 hours, As Needed (19 Dec 2022 20:06)  Vitamin C: 1 tab(s) orally once a day (19 Dec 2022 20:06)  Vitamin D3: 1 cap(s) orally once a day (19 Dec 2022 20:06)  vitamin E: 1 cap(s) orally once a day (19 Dec 2022 20:06)    MEDICATIONS  (STANDING):  ascorbic acid 500 milliGRAM(s) Oral daily  calcium carbonate    500 mG (Tums) Chewable 1 Tablet(s) Chew daily  cholecalciferol 1000 Unit(s) Oral daily  enoxaparin Injectable 40 milliGRAM(s) SubCutaneous every 24 hours  ferrous    sulfate 325 milliGRAM(s) Oral daily  omega-3-Acid Ethyl Esters 1 Gram(s) Oral <User Schedule>    MEDICATIONS  (PRN):  acetaminophen     Tablet .. 650 milliGRAM(s) Oral every 6 hours PRN Temp greater or equal to 38C (100.4F), Mild Pain (1 - 3)  aluminum hydroxide/magnesium hydroxide/simethicone Suspension 30 milliLiter(s) Oral every 4 hours PRN Dyspepsia  bismuth subsalicylate Liquid 30 milliLiter(s) Oral four times a day PRN Diarrhea  melatonin 3 milliGRAM(s) Oral at bedtime PRN Insomnia  ondansetron Injectable 4 milliGRAM(s) IV Push every 8 hours PRN Nausea and/or Vomiting      REVIEW OF SYSTEMS:  Limited , confused     All other review of systems is negative unless indicated above    Vital Signs Last 24 Hrs  T(C): 36.9 (20 Dec 2022 07:48), Max: 36.9 (20 Dec 2022 07:48)  T(F): 98.4 (20 Dec 2022 07:48), Max: 98.4 (20 Dec 2022 07:48)  HR: 69 (20 Dec 2022 07:48) (69 - 78)  BP: 117/69 (20 Dec 2022 07:48) (92/75 - 127/63)  BP(mean): 84 (20 Dec 2022 07:48) (83 - 85)  RR: 20 (20 Dec 2022 07:48) (17 - 20)  SpO2: 97% (20 Dec 2022 07:48) (95% - 100%)    Parameters below as of 20 Dec 2022 07:48  Patient On (Oxygen Delivery Method): nasal cannula  O2 Flow (L/min): 1      I&O's Summary    19 Dec 2022 07:01  -  20 Dec 2022 07:00  --------------------------------------------------------  IN: 0 mL / OUT: 500 mL / NET: -500 mL        PHYSICAL EXAM:    Constitutional: NAD, awake and alert, well-developed  HEENT: PERR, EOMI,  No oral cyananosis.  Neck:  supple,  No JVD  Respiratory: Breath sounds are clear bilaterally, No wheezing, rales or rhonchi  Cardiovascular: S1 and S2, regular rate and rhythm, ESM   Gastrointestinal: Bowel Sounds present, soft, nontender.   Extremities: 2 Plus LE  peripheral edema. No clubbing or cyanosis.  Vascular: 1+ peripheral pulses  Neurological: A/O x  no focal deficits  Musculoskeletal: no calf tenderness.  Skin: stasis changes in LE       LABS: All Labs Reviewed:                        8.9    6.22  )-----------( 171      ( 20 Dec 2022 06:50 )             28.6                         9.7    7.92  )-----------( 194      ( 19 Dec 2022 14:06 )             30.7     20 Dec 2022 06:50    141    |  104    |  26     ----------------------------<  86     3.8     |  34     |  0.48   19 Dec 2022 14:06    143    |  105    |  30     ----------------------------<  115    4.0     |  31     |  0.63     Ca    8.7        20 Dec 2022 06:50  Ca    9.3        19 Dec 2022 14:06  Mg     1.9       19 Dec 2022 18:33    TPro  6.7    /  Alb  2.6    /  TBili  0.9    /  DBili  x      /  AST  36     /  ALT  27     /  AlkPhos  117    19 Dec 2022 14:06    PT/INR - ( 19 Dec 2022 14:06 )   PT: 13.7 sec;   INR: 1.18 ratio         PTT - ( 19 Dec 2022 14:06 )  PTT:30.1 sec        Blood Culture:   12-19 @ 14:06  Pro Bnp 6801  - TroponinI hsT: <-34.91, <-40.84, <-51.53      RADIOLOGY/EKG:    EKG reviewed poor baseline  regular rhythm ? junctional  IVCD  non specific T changes

## 2022-12-20 NOTE — SWALLOW BEDSIDE ASSESSMENT ADULT - SWALLOW EVAL: SECRETION MANAGEMENT
Limited probes revealed that pt's non nutritive cough is somewhat moist and produced with somewhat decreased strength.

## 2022-12-20 NOTE — CONSULT NOTE ADULT - PROBLEM SELECTOR RECOMMENDATION 2
with appears to be junctional rhythm , pause > 4 second without pacemaker spike , will get it interrogated

## 2022-12-20 NOTE — CONSULT NOTE ADULT - SUBJECTIVE AND OBJECTIVE BOX
HPI:  89 y/o F with PMH uterine cancer s/p MICHAEL, TIA, CVA with residual right sided weakness, UTI, wheel chair dependent presented with shortness of breath. Pt's son was present at the bedside and stated that the pt has not had any strength the last 2 weeks. She started having difficulty getting up on Saturday and was short of breath. Sunday her breathing worsened. She did complain of chest pain and abdominal pain previously but does not have any today. Reports lower extremity swelling but son says her legs are much improved since wearing compression stockings and have been worse in the past. She does have a history of CVA and has residual right sided weakness, sometimes expressive aphasia and eats a pureed diet. She uses an electric wheelchair to get around. Denies fevers, chills,  N/V, diarrhea/constipation.     ER course: 87% on room air in triage -> 100% on 2L nasal cannula. VSS. Labs: Hb 9.7 (baseline ~8.4), .3, neutrophils 82.3%, INR 1.18, glucose 115, albumin 2.6, BNP 6801. VBG: pH 7.36, CO2 55, HCO3 31, COVID and RVP negative. EKG: Wide complex QRS, normal intervals (personally reviewed).     CXR: PPM, increased vascular congestion, left pleural effusion, no consolidation, no pneumothorax (personally reviewed).     Pt was given 81 mg of aspirin, 40 mg IVP lasix. She is being admitted to telemetry for further management.   (19 Dec 2022 20:29)      PAST MEDICAL & SURGICAL HISTORY:  CVA (cerebrovascular accident)      Uterine cancer      Urinary tract infection      S/P hysterectomy          FAMILY HISTORY:  FH: diabetes mellitus (Father)        SOCIAL HISTORY:  Allergies    No Known Allergies    Intolerances      MEDICATIONS  (STANDING):  ascorbic acid 500 milliGRAM(s) Oral daily  calcium carbonate    500 mG (Tums) Chewable 1 Tablet(s) Chew daily  cholecalciferol 1000 Unit(s) Oral daily  enoxaparin Injectable 40 milliGRAM(s) SubCutaneous every 24 hours  ferrous    sulfate 325 milliGRAM(s) Oral daily  omega-3-Acid Ethyl Esters 1 Gram(s) Oral <User Schedule>    MEDICATIONS  (PRN):  acetaminophen     Tablet .. 650 milliGRAM(s) Oral every 6 hours PRN Temp greater or equal to 38C (100.4F), Mild Pain (1 - 3)  aluminum hydroxide/magnesium hydroxide/simethicone Suspension 30 milliLiter(s) Oral every 4 hours PRN Dyspepsia  bismuth subsalicylate Liquid 30 milliLiter(s) Oral four times a day PRN Diarrhea  melatonin 3 milliGRAM(s) Oral at bedtime PRN Insomnia  ondansetron Injectable 4 milliGRAM(s) IV Push every 8 hours PRN Nausea and/or Vomiting    ROS:  General: Pt denies recent weight loss/fever/chills    Neurological: denies numbness or  sensation loss    Cardiovascular: denies chest pain/palpitations/leg edema    Respiratory and Thorax: denies SOB/cough/wheezing    Gastrointestinal: denies abdominal pain/diarrhea/constipation/bloody stool    Genitourinary: denies urinary frequency/urgency/ dysuria    Musculoskeletal: denies joint pain or swelling, denies restricted motion    Hematologic: denies abnormal bleeding  	    	  	    		        	    	          Physical Exam:  Vital Signs Last 24 Hrs  T(C): 36.8 (20 Dec 2022 11:17), Max: 36.9 (20 Dec 2022 08:13)  T(F): 98.2 (20 Dec 2022 11:17), Max: 98.4 (20 Dec 2022 08:13)  HR: 85 (20 Dec 2022 11:17) (69 - 85)  BP: 121/71 (20 Dec 2022 11:17) (92/75 - 127/63)  BP(mean): 85 (20 Dec 2022 11:17) (83 - 86)  RR: 20 (20 Dec 2022 11:17) (17 - 20)  SpO2: 95% (20 Dec 2022 11:17) (95% - 100%)    Parameters below as of 20 Dec 2022 11:17  Patient On (Oxygen Delivery Method): nasal cannula  O2 Flow (L/min): 1    Vital Signs : BP        HR       RR                 Constitutional: well developed,  no deformities and no acute distress    Neurological: Alert , PENNY, no focal deficits    HEENT: NC/AT, PERRLA, EOMI,  Neck supple.    Respiratory: CTA B/L, No wheezing/crackles/rhonchi    Cardiovascular: (+) S1 & S2, RRR, No m/r/g    Gastrointestinal: soft, NT, nondistended, (+) BS    Genitourinary: non distended bladder, voiding freely    Extremities: No pedal edema, No clubbing, No cyanosis    Skin:  normal skin color and pigmentation, no skin lesions          LABS:                        8.9    6.22  )-----------( 171      ( 20 Dec 2022 06:50 )             28.6     12-20    141  |  104  |  26<H>  ----------------------------<  86  3.8   |  34<H>  |  0.48<L>    Ca    8.7      20 Dec 2022 06:50  Mg     1.9     12-19    TPro  6.7  /  Alb  2.6<L>  /  TBili  0.9  /  DBili  x   /  AST  36  /  ALT  27  /  AlkPhos  117  12-19    PT/INR - ( 19 Dec 2022 14:06 )   PT: 13.7 sec;   INR: 1.18 ratio         PTT - ( 19 Dec 2022 14:06 )  PTT:30.1 sec      RADIOLOGY & ADDITIONAL STUDIES:    TELE:  EKG:  CXR:  echo: HPI:  89 y/o F with PMH uterine cancer s/p MICHAEL, TIA, CVA with residual right sided weakness, UTI, wheel chair dependent presented with shortness of breath. Pt's son was present at the bedside and stated that the pt has not had any strength the last 2 weeks. She started having difficulty getting up on Saturday and was short of breath.  her breathing worsened. She did complain of chest pain and abdominal pain previously but does not have any today. Reports lower extremity swelling but son says her legs are much improved since wearing compression stockings and have been worse in the past. She does have a history of CVA and has residual right sided weakness, sometimes expressive aphasia and eats a pureed diet. She uses an electric wheelchair to get around. Denies fevers, chills,  N/V, diarrhea/constipation.     ER course: 87% on room air in triage -> 100% on 2L nasal cannula. VSS. Labs: Hb 9.7 (baseline ~8.4), .3, neutrophils 82.3%, INR 1.18, glucose 115, albumin 2.6, BNP 6801. VBG: pH 7.36, CO2 55, HCO3 31, COVID and RVP negative. EKG: Wide complex QRS, normal intervals (personally reviewed).     CXR: PPM, increased vascular congestion, left pleural effusion, no consolidation, no pneumothorax (personally reviewed).     Pt was given 81 mg of aspirin, 40 mg IVP lasix. She is being admitted to telemetry for further management.       Tele: noted to have 4 sec pauses, EP called for PPM interrogation- PPM battery end of service, not even Magnet rate.  As per pt's son, doesn't remember last PPM check.  Pt was told have ' arrhythmia' was on coumadin but d/c'd d/t severe bruises with high risk for falls, pt's son still refusing OAC.    EKbpm, AFib vs junctional rhythm  Tele: 's Afib vs junctional rhythm  Pre-rose echo today: NL LVEF      PAST MEDICAL & SURGICAL HISTORY:  CVA (cerebrovascular accident)      Uterine cancer      Urinary tract infection      S/P hysterectomy          FAMILY HISTORY:  FH: diabetes mellitus (Father)        SOCIAL HISTORY: lives with son , wheelchair bound  Allergies    No Known Allergies      MEDICATIONS  (STANDING):  ascorbic acid 500 milliGRAM(s) Oral daily  calcium carbonate    500 mG (Tums) Chewable 1 Tablet(s) Chew daily  cholecalciferol 1000 Unit(s) Oral daily  enoxaparin Injectable 40 milliGRAM(s) SubCutaneous every 24 hours  ferrous    sulfate 325 milliGRAM(s) Oral daily  omega-3-Acid Ethyl Esters 1 Gram(s) Oral <User Schedule>    MEDICATIONS  (PRN):  acetaminophen     Tablet .. 650 milliGRAM(s) Oral every 6 hours PRN Temp greater or equal to 38C (100.4F), Mild Pain (1 - 3)  aluminum hydroxide/magnesium hydroxide/simethicone Suspension 30 milliLiter(s) Oral every 4 hours PRN Dyspepsia  bismuth subsalicylate Liquid 30 milliLiter(s) Oral four times a day PRN Diarrhea  melatonin 3 milliGRAM(s) Oral at bedtime PRN Insomnia  ondansetron Injectable 4 milliGRAM(s) IV Push every 8 hours PRN Nausea and/or Vomiting    ROS: unable to obtain d/t pt's aphasic      Physical Exam:  Vital Signs Last 24 Hrs  T(C): 36.8 (20 Dec 2022 11:17), Max: 36.9 (20 Dec 2022 08:13)  T(F): 98.2 (20 Dec 2022 11:17), Max: 98.4 (20 Dec 2022 08:13)  HR: 85 (20 Dec 2022 11:17) (69 - 85)  BP: 121/71 (20 Dec 2022 11:17) (92/75 - 127/63)  BP(mean): 85 (20 Dec 2022 11:17) (83 - 86)  RR: 20 (20 Dec 2022 11:17) (17 - 20)  SpO2: 95% (20 Dec 2022 11:17) (95% - 100%)    Parameters below as of 20 Dec 2022 11:17  Patient On (Oxygen Delivery Method): nasal cannula  O2 Flow (L/min): 1                  Constitutional: well developed,  no deformities and no acute distress    Neurological: awake,  PENNY,    HEENT: NC/AT, PERRLA, EOMI,  Neck supple.    Respiratory: B/L fine basilar crackles    Cardiovascular: (+) irregular S1 & S2,     Gastrointestinal: soft, NT, nondistended, (+) BS    Genitourinary: non distended bladder, voiding freely    Extremities: (+) chronic lymphedema in both legs    Skin:  (+)bruises on Rt breast & RUE          LABS:                        8.9    6.22  )-----------( 171      ( 20 Dec 2022 06:50 )             28.6         141  |  104  |  26<H>  ----------------------------<  86  3.8   |  34<H>  |  0.48<L>    Ca    8.7      20 Dec 2022 06:50  Mg     1.9         TPro  6.7  /  Alb  2.6<L>  /  TBili  0.9  /  DBili  x   /  AST  36  /  ALT  27  /  AlkPhos  117      PT/INR - ( 19 Dec 2022 14:06 )   PT: 13.7 sec;   INR: 1.18 ratio         PTT - ( 19 Dec 2022 14:06 )  PTT:30.1 sec

## 2022-12-20 NOTE — SWALLOW BEDSIDE ASSESSMENT ADULT - ADDITIONAL RECOMMENDATIONS
HOSPITALIST AND NUTRITION F/U. PT WITH CHRONIC DYSPHAGIA AND IS AT LONG TERM NUTRITION RISK. WITH THAT BEING STATED, I DO NOT FEEL THAT PLACEMENT OF A FEEDING TUBE WOULD ENHANCE LIFE QUALITY GIVEN ADVANCED AGE, DEMENTIA, MUSCLE WASTING, CHRONICITY OF DYSPHAGIA AND CONSIDERING OTHER COMORBIDITIES.

## 2022-12-20 NOTE — SWALLOW BEDSIDE ASSESSMENT ADULT - ORAL PHASE
Bolus formation/transfer were achieved via mildly to moderately prolonged and discontinuous but felt to be grossly mechanically functional with some of the above modified food consistencies. Piecemeal deglutition was evident. Mild tongue debris noted with puree foods.

## 2022-12-20 NOTE — SWALLOW BEDSIDE ASSESSMENT ADULT - SWALLOW EVAL: DIAGNOSIS
1) On encounter, pt was noted to be frail in appearance. A cervical kyphosis was evident. Some loss of mass was noted in strap muscle regions. The pt is missing several teeth. The pt occasionally verbalized during communicative probes. At these times, her verbalizations were produced in Ivorian with scattered English. When she verbalized, her motor speech abilities were felt to be grossly functional. However, her verbalizations were typically brief/vague, often rote in nature(i.e "Si") and sometimes contextually irrelevant. The latter is indicative of chronic Cognitive Linguistic Dysfunction which is chronic/pre-existing in setting of Dementia as well as past CVA.

## 2022-12-20 NOTE — ED ADULT NURSE REASSESSMENT NOTE - NS ED NURSE REASSESS COMMENT FT1
Pt's HR 32-39 sustained.  /96. Alert and responsive. Pacer pads applied. Dr Shirley made aware. Awaiting further orders. Pt's son at the bedside. Monitored closely.

## 2022-12-20 NOTE — CONSULT NOTE ADULT - SUBJECTIVE AND OBJECTIVE BOX
ICU Consult Note    HPI:    S:    Pt seen and examined  HD # 2  FULL CODE  89 y/o F with PMH uterine cancer s/p MICHAEL, TIA, CVA with residual right sided weakness, UTI, wheel chair dependent presented with shortness of breath. Pt's son was present at the bedside and stated that the pt has not had any strength the last 2 weeks. She started having difficulty getting up on Saturday and was short of breath. Sunday her breathing worsened. She did complain of chest pain and abdominal pain previously but does not have any today. Reports lower extremity swelling but son says her legs are much improved since wearing compression stockings and have been worse in the past. She does have a history of CVA and has residual right sided weakness, sometimes expressive aphasia and eats a pureed diet. She uses an electric wheelchair to get around. Denies fevers, chills,  N/V, diarrhea/constipation.     Bradycardia here for PPM generator change    12/20: Awake and alert. Pending PPM change tomorrow.     ROS: + SOB, palpitations  Remainder of systems reviewed, negative    Allergies    No Known Allergies    Intolerances    MEDICATIONS  (STANDING):    ascorbic acid 500 milliGRAM(s) Oral daily  calcium carbonate    500 mG (Tums) Chewable 1 Tablet(s) Chew daily  cholecalciferol 1000 Unit(s) Oral daily  enoxaparin Injectable 40 milliGRAM(s) SubCutaneous every 24 hours  ferrous    sulfate 325 milliGRAM(s) Oral daily  omega-3-Acid Ethyl Esters 1 Gram(s) Oral <User Schedule>    MEDICATIONS  (PRN):    acetaminophen     Tablet .. 650 milliGRAM(s) Oral every 6 hours PRN Temp greater or equal to 38C (100.4F), Mild Pain (1 - 3)  aluminum hydroxide/magnesium hydroxide/simethicone Suspension 30 milliLiter(s) Oral every 4 hours PRN Dyspepsia  bismuth subsalicylate Liquid 30 milliLiter(s) Oral four times a day PRN Diarrhea  melatonin 3 milliGRAM(s) Oral at bedtime PRN Insomnia  ondansetron Injectable 4 milliGRAM(s) IV Push every 8 hours PRN Nausea and/or Vomiting    Drug Dosing Weight    Height (cm): 149.9 (19 Dec 2022 13:53)  Weight (kg): 52.2 (19 Dec 2022 13:53)  BMI (kg/m2): 23.2 (19 Dec 2022 13:53)  BSA (m2): 1.46 (19 Dec 2022 13:53)    PAST MEDICAL & SURGICAL HISTORY:    CVA (cerebrovascular accident)  Uterine cancer  Urinary tract infection  S/P hysterectomy    FAMILY HISTORY:    FH: diabetes mellitus (Father)    ROS: See HPI; otherwise, all systems reviewed and negative.    O:    ICU Vital Signs Last 24 Hrs  T(C): 36.8 (20 Dec 2022 11:17), Max: 36.9 (20 Dec 2022 08:13)  T(F): 98.2 (20 Dec 2022 11:17), Max: 98.4 (20 Dec 2022 08:13)  HR: 37 (20 Dec 2022 13:30) (37 - 85)  BP: 101/68 (20 Dec 2022 13:30) (92/75 - 127/63)  BP(mean): 85 (20 Dec 2022 11:17) (83 - 86)  ABP: --  ABP(mean): --  RR: 22 (20 Dec 2022 13:30) (17 - 22)  SpO2: 97% (20 Dec 2022 13:30) (95% - 100%)    O2 Parameters below as of 20 Dec 2022 13:30  Patient On (Oxygen Delivery Method): nasal cannula  O2 Flow (L/min): 1    I&O's Detail    19 Dec 2022 07:01  -  20 Dec 2022 07:00  --------------------------------------------------------  IN:  Total IN: 0 mL    OUT:    Voided (mL): 500 mL  Total OUT: 500 mL    Total NET: -500 mL    PE:    Elderly F lying in bed  No JVD trachea midline  S1S2+  CTA B/L  Abd soft NTND  No leg swelling/edema noted  Awake and alert  Skin pink, well perfused    LABS:    CBC Full  -  ( 20 Dec 2022 06:50 )  WBC Count : 6.22 K/uL  RBC Count : 2.83 M/uL  Hemoglobin : 8.9 g/dL  Hematocrit : 28.6 %  Platelet Count - Automated : 171 K/uL  Mean Cell Volume : 101.1 fl  Mean Cell Hemoglobin : 31.4 pg  Mean Cell Hemoglobin Concentration : 31.1 gm/dL  Auto Neutrophil # : 5.29 K/uL  Auto Lymphocyte # : 0.50 K/uL  Auto Monocyte # : 0.44 K/uL  Auto Eosinophil # : 0.00 K/uL  Auto Basophil # : 0.00 K/uL  Auto Neutrophil % : 77.0 %  Auto Lymphocyte % : 8.0 %  Auto Monocyte % : 7.0 %  Auto Eosinophil % : 0.0 %  Auto Basophil % : 0.0 %    12-20    141  |  104  |  26<H>  ----------------------------<  86  3.8   |  34<H>  |  0.48<L>    Ca    8.7      20 Dec 2022 06:50  Mg     1.9     12-19    TPro  6.7  /  Alb  2.6<L>  /  TBili  0.9  /  DBili  x   /  AST  36  /  ALT  27  /  AlkPhos  117  12-19    PT/INR - ( 19 Dec 2022 14:06 )   PT: 13.7 sec;   INR: 1.18 ratio         PTT - ( 19 Dec 2022 14:06 )  PTT:30.1 sec    CAPILLARY BLOOD GLUCOSE      POCT Blood Glucose.: 77 mg/dL (20 Dec 2022 12:03)  POCT Blood Glucose.: 85 mg/dL (20 Dec 2022 07:57)        LIVER FUNCTIONS - ( 19 Dec 2022 14:06 )  Alb: 2.6 g/dL / Pro: 6.7 gm/dL / ALK PHOS: 117 U/L / ALT: 27 U/L / AST: 36 U/L / GGT: x

## 2022-12-20 NOTE — PHYSICAL THERAPY INITIAL EVALUATION ADULT - ACTIVE RANGE OF MOTION EXAMINATION, REHAB EVAL
HASMUKH limtied 2/2 weakness. pt not lifting LEs on commands, noted pitting edema./bilateral upper extremity Active ROM was WFL (within functional limits)

## 2022-12-20 NOTE — SWALLOW BEDSIDE ASSESSMENT ADULT - SWALLOW EVAL: PROGNOSIS
2) Pt exhibits chronic Oropharyngeal Dysphagia which subjectively appeared to be a grossly functional condition with a restricted inventory of modified food textures. Overt aspiration signs/coughing demonstrated with liquids less viscous than moderately thick fluids. Dysphagia is chronic/irreversible/pre-existing in setting of Dementia, muscle wasting, many missing teeth and old stroke. Swallow integrity felt to be at pre-hospitalization state.

## 2022-12-20 NOTE — SWALLOW BEDSIDE ASSESSMENT ADULT - COMMENTS
The pt was admitted to  with SOB. Hospital course is notable for CHF exacerbation, hypoxia, bradycardia s/p generator change in pacemaker, anemia and hyperglycemia. This profile is superimposed upon a history of Dementia, arrythmia s/p PPM, uterine cancer s/p MICHAEL and prior CVA with residual chronic Aphasia/Dysphagia.  stated that at home, pt is on a puree diet and does better when liquids are thickened. Pt stated that she likes thick soups and beverage thickener has been used when liquids are too thin.

## 2022-12-20 NOTE — PATIENT PROFILE ADULT - FALL HARM RISK - HARM RISK INTERVENTIONS

## 2022-12-20 NOTE — ED ADULT NURSE REASSESSMENT NOTE - NS ED NURSE REASSESS COMMENT FT1
Pt is calm now that her son is here. Pt was given skin care. Right underside of arm and right breast very eccymotic. Son states it happened at home getting pt from wheelchair to bed. Right side is weak from old stroke. Pt incontinant of urine. Pt has IAD to perineum. Gregory applied. Son instructed in plan of care. RSR/ afib on monitor.

## 2022-12-20 NOTE — PHYSICAL THERAPY INITIAL EVALUATION ADULT - PERTINENT HX OF CURRENT PROBLEM, REHAB EVAL
89 y/o F with PMH uterine cancer s/p MICHAEL, TIA, CVA with residual right sided weakness, UTI, wheel chair dependent presented with shortness of breath.    CXR: The cardiac silhouette is prominent with AV sequential pacemaker and pulmonary venous congestion. Left lower lobe atelectasis and/or pleural effusion.

## 2022-12-20 NOTE — SWALLOW BEDSIDE ASSESSMENT ADULT - ORAL PREPARATORY PHASE
Pt tended to accept PO in small volumes. Oral aperture was decreased when accepting PO. Labial grading on utensils was grossly functional.

## 2022-12-20 NOTE — SWALLOW BEDSIDE ASSESSMENT ADULT - SWALLOW EVAL: RECOMMENDED FEEDING/EATING TECHNIQUES
check mouth frequently for oral residue/pocketing/crush medication (when feasible)/position upright (90 degrees)/small sips/bites

## 2022-12-20 NOTE — SWALLOW BEDSIDE ASSESSMENT ADULT - SLP GENERAL OBSERVATIONS
On encounter, pt was noted to be frail in appearance. A cervical kyphosis was evident. Some loss of mass was noted in strap muscle regions. The pt is missing several teeth. The pt occasionally verbalized during communicative probes. At these times, her verbalizations were produced in Azeri with scattered English. When she verbalized, her motor speech abilities were felt to be grossly functional. However, her verbalizations were typically brief/vague, often rote in nature(i.e "Si") and sometimes contextually irrelevant. The latter is indicative of chronic Cognitive Linguistic Dysfunction which is chronic/pre-existing in setting of Dementia as well as past CVA.

## 2022-12-20 NOTE — SWALLOW BEDSIDE ASSESSMENT ADULT - SWALLOW EVAL: RECOMMENDED DIET
SUGGEST A PUREE TEXTURE DIET WITH MODERATELY THICK LIQUIDS AS THIS IS THE LEAST RESTRICTIVE TOLERABLE DIET CONSISTENCIES FROM AN OROPHARYNGEAL SWALLOWING STANCE ON CLINICAL EXAM.  PT NEEDS ASSISTANCE TO FEED AND MUST BE IN AN ALERT STATE WHEN BEING FED.

## 2022-12-20 NOTE — SWALLOW BEDSIDE ASSESSMENT ADULT - NS SPL SWALLOW CLINIC TRIAL FT
Pt exhibited chronic Oropharyngeal Dysphagia which subjectively appeared to be a grossly functional condition with a restricted inventory of modified food textures on exam. Overt aspiration signs/coughing demonstrated with liquids less viscous than moderately thick fluids. Dysphagia is chronic/irreversible/pre-existing in setting of Dementia, muscle wasting, many missing teeth and old stroke. Swallow integrity felt to be at pre-hospitalization state. Pt exhibited chronic Oropharyngeal Dysphagia which subjectively appeared to be a grossly functional condition with a restricted inventory of modified food textures on exam. Overt aspiration signs/coughing demonstrated with liquids less viscous than moderately thick fluids. Dysphagia is chronic/irreversible/pre-existing in setting of Dementia, muscle wasting, many missing teeth and old stroke. Swallow integrity felt to be at pre-hospitalization state. Solids and thin liquids not offered given chronic Dysphagia.

## 2022-12-20 NOTE — PHYSICAL THERAPY INITIAL EVALUATION ADULT - ADDITIONAL COMMENTS
HHA 5hrs per day. as per CM note, pt has been having more difficulty with use of w/c and requires more assist.

## 2022-12-20 NOTE — PATIENT PROFILE ADULT - FUNCTIONAL ASSESSMENT - BASIC MOBILITY 6.
1-calculated by average/Not able to assess (calculate score using [facility] averaging method)

## 2022-12-20 NOTE — SWALLOW BEDSIDE ASSESSMENT ADULT - SWALLOW EVAL: CRITERIA FOR SKILLED INTERVENTION MET
DO NOT FEEL THAT ACUTE SPEECH PATHOLOGY FOLLOW UP WOULD CHANGE CLINICAL MANAGEMENT/OUTCOME IN ACUTE HOSPITAL SETTING AT THIS TIME. PT'S DYSPHAGIA AND COGNITIVE LINGUISTIC DYSFUNCTION ARE CHRONIC/PRE-EXISTING/IRREVERSIBLE. COMMUNICATIVE AND OROPHARYNGEAL SWALLOWING INTEGRITY ARE FELT TO BE AT PRE-HOSPITALIZATION STATE. PT WOULD NOT BE A VIABLE THERAPY CANDIDATE NONETHELESS GIVEN THE SEVERITY OF HER CHRONIC COGNITIVE DYSFUNCTION. GIVEN ABOVE, THIS SERVICE WILL NOT ACTIVELY FOLLOW. RECONSULT PRN SHOULD STATUS CHANGE AND CONDITION WARRANT.

## 2022-12-20 NOTE — SWALLOW BEDSIDE ASSESSMENT ADULT - ASR SWALLOW DENTITION
Notable fro natural dentition with many missing teeth. Notable for natural dentition with many missing teeth.

## 2022-12-20 NOTE — SWALLOW BEDSIDE ASSESSMENT ADULT - PHARYNGEAL PHASE
Swallow trigger was timely to mildly latent. Laryngeal lift on palpation during swallowing trials was mildly to moderately reduced but felt to be grossly functional with some of the above modified food textures. Post prandial coughing exhibited with mildly thick liquids, despite cues to employ compensatory swallowing maneuvers. No behavioral aspiration signs exhibited with moderately thick liquids and puree foods.

## 2022-12-21 PROBLEM — C55 MALIGNANT NEOPLASM OF UTERUS, PART UNSPECIFIED: Chronic | Status: ACTIVE | Noted: 2022-01-01

## 2022-12-21 PROBLEM — N39.0 URINARY TRACT INFECTION, SITE NOT SPECIFIED: Chronic | Status: ACTIVE | Noted: 2022-01-01

## 2022-12-21 NOTE — PROGRESS NOTE ADULT - ASSESSMENT
ASSESSMENT ASSESSMENT  89 y/o F with PMH uterine cancer s/p MICHAEL, TIA, CVA with residual right sided weakness, UTI, wheel chair dependent presented with shortness of breath.    Admitted for   1. Acute hypoxic respiratory failure 2/2 pulm edema, decompensated HF  2. Bradycardia d/t PPM generator battery malfunction    s/p PPM generator change 12/20    PLAN  - pain control prn  - family interested in home hospice, referral placed - palliative consulted  - ativan prn, morphine prn  - will add PO lasix 20 qd for symptom relief as per cardio  - titrate supplemental home O2 as tolerated  - PO diet. nutrition consult appreciated    Dispo: Accepted to home hospice. likely dc tomorrow.     Will discuss with Dr. Infante

## 2022-12-21 NOTE — DIETITIAN INITIAL EVALUATION ADULT - ADD RECOMMEND
1. Provide pureed diet and moderately thick liquids as per SLP recommendations to maximize caloric and protein intake; encourage protein-rich foods  2. Provide Ensure + HP shake TID (moderately thick) to optimize nutritional needs  3. Monitor bowel movements, if no BM for >3 days, consider implementing bowel regimen  4. MVI w/ minerals daily to ensure 100% RDA met   5. Consider adding thiamine 100 mg daily 2/2 poor PO intake/ malnutrition   6. Monitor daily wt to track/trend changes; strict I/Os  7. GOC confirmed as per palliative care regarding nutrition support - NO TF. Hospice home care upon discharge (SW consult appreciated)  RD will continue to monitor PO intake, labs, hydration, and wt prn.

## 2022-12-21 NOTE — PROGRESS NOTE ADULT - SUBJECTIVE AND OBJECTIVE BOX
history obtained from chart     CHIEF COMPLAINT: shortness of breath     HPI:  89 y/o F with PMH uterine cancer s/p MICHAEL, TIA, CVA with residual right sided weakness, UTI, wheel chair dependent presented with shortness of breath. Pt's son was present at the bedside and stated that the pt has not had any strength the last 2 weeks. She started having difficulty getting up on Saturday and was short of breath. Sunday her breathing worsened. She did complain of chest pain and abdominal pain previously but does not have any today. Reports lower extremity swelling but son says her legs are much improved since wearing compression stockings and have been worse in the past. She does have a history of CVA and has residual right sided weakness, sometimes expressive aphasia and eats a pureed diet. She uses an electric wheelchair to get around. Denies fevers, chills,  N/V, diarrhea/constipation.     ER course: 87% on room air in triage -> 100% on 2L nasal cannula. VSS. Labs: Hb 9.7 (baseline ~8.4), .3, neutrophils 82.3%, INR 1.18, glucose 115, albumin 2.6, BNP 6801. VBG: pH 7.36, CO2 55, HCO3 31, COVID and RVP negative. EKG: Wide complex QRS, normal intervals (personally reviewed).     CXR: PPM, increased vascular congestion, left pleural effusion, no consolidation, no pneumothorax (personally reviewed).     Pt was given 81 mg of aspirin, 40 mg IVP lasix. She is being admitted to telemetry for further management.  Patient  monitor showed pause >4 seconds ,  patient currently comfortable     12/21/22 Seated in bedside chair NAD No CP ( VIA  ) Tele V Paced tachy up to 120 BB initiated today, Hospice      PAST MEDICAL & SURGICAL HISTORY:  CVA (cerebrovascular accident)      Uterine cancer      Urinary tract infection      S/P hysterectomy          Allergies    No Known Allergies    Intolerances      Social History:  Lives with her . Has a home health aid that comes 2x per day. Denies smoking, alcohol, drug use. Uses an electric wheelchair to ambulate. (19 Dec 2022 20:29)      FAMILY HISTORY:  FH: diabetes mellitus (Father)      MEDICATIONS  (STANDING):  ascorbic acid 500 milliGRAM(s) Oral daily  calcium carbonate    500 mG (Tums) Chewable 1 Tablet(s) Chew daily  cholecalciferol 1000 Unit(s) Oral daily  ferrous    sulfate 325 milliGRAM(s) Oral daily  metoprolol tartrate 12.5 milliGRAM(s) Oral every 12 hours  omega-3-Acid Ethyl Esters 1 Gram(s) Oral <User Schedule>    MEDICATIONS  (PRN):  acetaminophen     Tablet .. 650 milliGRAM(s) Oral every 6 hours PRN Temp greater or equal to 38C (100.4F), Mild Pain (1 - 3)  aluminum hydroxide/magnesium hydroxide/simethicone Suspension 30 milliLiter(s) Oral every 4 hours PRN Dyspepsia  bismuth subsalicylate Liquid 30 milliLiter(s) Oral four times a day PRN Diarrhea  melatonin 3 milliGRAM(s) Oral at bedtime PRN Insomnia  ondansetron Injectable 4 milliGRAM(s) IV Push every 8 hours PRN Nausea and/or Vomiting      Vital Signs Last 24 Hrs  T(C): 36.4 (21 Dec 2022 04:38), Max: 36.8 (20 Dec 2022 15:58)  T(F): 97.5 (21 Dec 2022 04:38), Max: 98.3 (20 Dec 2022 15:58)  HR: 120 (21 Dec 2022 12:00) (37 - 125)  BP: 106/45 (21 Dec 2022 12:00) (101/68 - 144/49)  BP(mean): 59 (21 Dec 2022 12:00) (59 - 106)  RR: 16 (21 Dec 2022 12:00) (16 - 29)  SpO2: 90% (21 Dec 2022 12:00) (88% - 100%)    Parameters below as of 20 Dec 2022 18:17  Patient On (Oxygen Delivery Method): nasal cannula  O2 Flow (L/min): 2        PHYSICAL EXAM:    Constitutional: NAD, awake and alert, well-developed  HEENT: PERR, EOMI,  No oral cyananosis.  Neck:  supple,  No JVD  Respiratory: Breath sounds are clear bilaterally  Cardiovascular: S1 and S2, regular,+ ESM   Gastrointestinal: Abd soft, nontender.   Extremities: 2+ B/L LE Edema  Neurological: A/O x  no focal deficits  Musculoskeletal: no calf tenderness.  Skin: stasis changes in LE       LABS: All Labs Reviewed:                        8.9    6.22  )-----------( 171      ( 20 Dec 2022 06:50 )             28.6                         9.7    7.92  )-----------( 194      ( 19 Dec 2022 14:06 )             30.7     20 Dec 2022 06:50    141    |  104    |  26     ----------------------------<  86     3.8     |  34     |  0.48   19 Dec 2022 14:06    143    |  105    |  30     ----------------------------<  115    4.0     |  31     |  0.63     Ca    8.7        20 Dec 2022 06:50  Ca    9.3        19 Dec 2022 14:06  Mg     1.9       19 Dec 2022 18:33    TPro  6.7    /  Alb  2.6    /  TBili  0.9    /  DBili  x      /  AST  36     /  ALT  27     /  AlkPhos  117    19 Dec 2022 14:06    PT/INR - ( 19 Dec 2022 14:06 )   PT: 13.7 sec;   INR: 1.18 ratio         PTT - ( 19 Dec 2022 14:06 )  PTT:30.1 sec        Blood Culture:   12-19 @ 14:06  Pro Bnp 6801  - TroponinI hsT: <-34.91, <-40.84, <-51.53      RADIOLOGY/EKG:    EKG reviewed poor baseline  regular rhythm ? junctional  IVCD  non specific T changes     Summary     The left ventricle is normal in size, wall thickness, wall motion and   contractility.   Estimated left ventricular ejection fraction is 60-65 %.   The left atrium is severely dilated.   A device wire is seen in the RV and RA.   Mild aortic sclerosis is present with mildly restricted valvular opening.   Mild (1+) aortic regurgitation is present.   The mitral valve leaflets appear thickened.   Mild mitral annular calcification is present.   Mild (1+) mitral regurgitation is present.   Moderate (2+) tricuspid valve regurgitation is present.   Moderate pulmonary hypertension.     Signature     ----------------------------------------------------------------   Electronically signed by Ethan Venegas(Interpreting physician)

## 2022-12-21 NOTE — PROGRESS NOTE ADULT - SUBJECTIVE AND OBJECTIVE BOX
HPI:  89 y/o F with PMH uterine cancer s/p MICHAEL, TIA, CVA with residual right sided weakness, UTI, wheel chair dependent presented with shortness of breath. Pt's son was present at the bedside and stated that the pt has not had any strength the last 2 weeks. She started having difficulty getting up on Saturday and was short of breath. Sunday her breathing worsened. She did complain of chest pain and abdominal pain previously but does not have any today. Reports lower extremity swelling but son says her legs are much improved since wearing compression stockings and have been worse in the past. She does have a history of CVA and has residual right sided weakness, sometimes expressive aphasia and eats a pureed diet. She uses an electric wheelchair to get around. Denies fevers, chills,  N/V, diarrhea/constipation.   ER course: 87% on room air in triage -> 100% on 2L nasal cannula. VSS. Labs: Hb 9.7 (baseline ~8.4), .3, neutrophils 82.3%, INR 1.18, glucose 115, albumin 2.6, BNP 6801. VBG: pH 7.36, CO2 55, HCO3 31, COVID and RVP negative. EKG: Wide complex QRS, normal intervals   CXR: PPM, increased vascular congestion, left pleural effusion, no consolidation, no pneumothorax (personally reviewed)  Pt was given 81 mg of aspirin, 40 mg IVP lasix. She is being admitted to telemetry for further management.  Patient noted to have PPM at EOS, not even at magnet rate with junctional/AF with rates down to 30s yesterday.    12/21/22: Patient s/p PPM generator change POD#1.  Seen at bedside, awake but unable to assess if any complaints.  TELE: V paced up to 110bpm      Vital Signs Last 24 Hrs  T(C): 36.4 (21 Dec 2022 04:38), Max: 36.8 (20 Dec 2022 11:17)  T(F): 97.5 (21 Dec 2022 04:38), Max: 98.3 (20 Dec 2022 15:58)  HR: 60 (21 Dec 2022 08:00) (37 - 116)  BP: 113/44 (21 Dec 2022 08:00) (101/68 - 144/49)  BP(mean): 61 (21 Dec 2022 08:00) (59 - 106)  RR: 19 (21 Dec 2022 08:00) (18 - 29)  SpO2: 88% (21 Dec 2022 08:00) (88% - 100%)    Parameters below as of 20 Dec 2022 18:17  Patient On (Oxygen Delivery Method): nasal cannula  O2 Flow (L/min): 2      PHYSICAL EXAMINATION:  GENERAL: Awake, arousable, wearing non rebreather  HEART: Left subclavicular incision C/D/I, mild ecchymosis no hematoma noted  PULMONARY: B/L crackles  ABDOMEN: Soft, Nontender, Nondistended; Bowel sounds present  EXTREMITIES:  Chronic lymphedema  NEUROLOGICAL: awake, PENNY    Labs:  12-21    142  |  105  |  35<H>  ----------------------------<  126<H>  4.1   |  28  |  0.81    Ca    9.1      21 Dec 2022 06:31  Phos  3.6     12-21  Mg     2.4     12-21    TPro  6.7  /  Alb  2.6<L>  /  TBili  0.9  /  DBili  x   /  AST  36  /  ALT  27  /  AlkPhos  117  12-19                          9.4    13.44 )-----------( 258      ( 21 Dec 2022 06:31 )             30.0

## 2022-12-21 NOTE — DIETITIAN INITIAL EVALUATION ADULT - OTHER INFO
89 y/o F with PMH uterine cancer s/p MICHAEL, TIA, CVA with residual right sided weakness, UTI, wheel chair dependent presented with shortness of breath. Pt's son was present at the bedside and stated that the pt has not had any strength the last 2 weeks. She started having difficulty getting up on Saturday and was short of breath. Sunday her breathing worsened. She did complain of chest pain and abdominal pain previously but does not have any today. Reports lower extremity swelling but son says her legs are much improved since wearing compression stockings and have been worse in the past. She does have a history of CVA and has residual right sided weakness, sometimes expressive aphasia and eats a pureed diet. She uses an electric wheelchair to get around. Denies fevers, chills,  N/V, diarrhea/constipation.     Pt w/ poor appetite since admission as per RN. Currently on pureed diet w/ moderately thick liquids (as per SLP recommendations). RD observed nursing assistant feeding pt pureed breakfast upon assessment, however moderately thick liquids were not on tray. Spoke w/ RN and palliative MD - pending home hospice care, SW involved. Pt now w/ advance directives: DNR/DNI/NO TF. Unable to obtain bed scale wt 2/2 scale not working. Current wt doc'd on 12/21 at 122#. Wt doc'd at 114# on 12/20 (3+ edema could be skewing wt appearance). Appeared very thin, frail, bony upon assessment. NFPE reveals severe muscle/fat wasting, meets criteria for PCM. Will trial Ensure + HP shake (moderately thick) TID to optimize nutritional needs. See recommendations below.

## 2022-12-21 NOTE — PROGRESS NOTE ADULT - SUBJECTIVE AND OBJECTIVE BOX
Patient is a 89y old  Female who presents with a chief complaint of SOB (21 Dec 2022 10:58)      BRIEF HOSPITAL COURSE: ***    Events last 24 hours: ***    PAST MEDICAL & SURGICAL HISTORY:  CVA (cerebrovascular accident)      Uterine cancer      Urinary tract infection      S/P hysterectomy            Medications:    metoprolol tartrate 12.5 milliGRAM(s) Oral every 12 hours      acetaminophen     Tablet .. 650 milliGRAM(s) Oral every 6 hours PRN  melatonin 3 milliGRAM(s) Oral at bedtime PRN  ondansetron Injectable 4 milliGRAM(s) IV Push every 8 hours PRN        aluminum hydroxide/magnesium hydroxide/simethicone Suspension 30 milliLiter(s) Oral every 4 hours PRN  bismuth subsalicylate Liquid 30 milliLiter(s) Oral four times a day PRN  calcium carbonate    500 mG (Tums) Chewable 1 Tablet(s) Chew daily        ascorbic acid 500 milliGRAM(s) Oral daily  cholecalciferol 1000 Unit(s) Oral daily  ferrous    sulfate 325 milliGRAM(s) Oral daily        omega-3-Acid Ethyl Esters 1 Gram(s) Oral <User Schedule>          ICU Vital Signs Last 24 Hrs  T(C): 36.4 (21 Dec 2022 04:38), Max: 36.8 (20 Dec 2022 15:58)  T(F): 97.5 (21 Dec 2022 04:38), Max: 98.3 (20 Dec 2022 15:58)  HR: 60 (21 Dec 2022 08:00) (37 - 116)  BP: 113/44 (21 Dec 2022 08:00) (101/68 - 144/49)  BP(mean): 61 (21 Dec 2022 08:00) (59 - 106)  ABP: --  ABP(mean): --  RR: 19 (21 Dec 2022 08:00) (18 - 29)  SpO2: 88% (21 Dec 2022 08:00) (88% - 100%)    O2 Parameters below as of 20 Dec 2022 18:17  Patient On (Oxygen Delivery Method): nasal cannula  O2 Flow (L/min): 2              I&O's Detail        LABS:                        9.4    13.44 )-----------( 258      ( 21 Dec 2022 06:31 )             30.0     12-21    142  |  105  |  35<H>  ----------------------------<  126<H>  4.1   |  28  |  0.81    Ca    9.1      21 Dec 2022 06:31  Phos  3.6     12-21  Mg     2.4     12-21    TPro  6.7  /  Alb  2.6<L>  /  TBili  0.9  /  DBili  x   /  AST  36  /  ALT  27  /  AlkPhos  117  12-19          CAPILLARY BLOOD GLUCOSE      POCT Blood Glucose.: 77 mg/dL (20 Dec 2022 12:03)    PT/INR - ( 21 Dec 2022 06:31 )   PT: 14.1 sec;   INR: 1.21 ratio         PTT - ( 21 Dec 2022 06:31 )  PTT:31.1 sec    CULTURES:  Culture Results:   No growth to date. (12-19 @ 14:06)  Culture Results:   No growth to date. (12-19 @ 14:06)  Rapid RVP Result: NotDetec (12-19 @ 14:06)      Physical Examination:    General: appears uncomfortable, some accessory muscle use. frail appearing   HEENT: Pupils equal, reactive to light.  Symmetric.  PULM: decreased breath sounds b/l, crackles at bases  NECK: Supple, no lymphadenopathy, trachea midline  CVS: Regular rate and rhythm, no murmurs  ABD: Soft, nondistended, nontender, normoactive bowel sounds  EXT: 2+ pitting edema b/l,   SKIN: Warm and well perfused, no rashes noted.  NEURO: awake but unable to communicate appropriately    DEVICES:     RADIOLOGY: ***    CRITICAL CARE TIME SPENT: ***   Patient is a 89y old  Female who presents with a chief complaint of SOB (21 Dec 2022 10:58)      BRIEF HOSPITAL COURSE: 89 y/o F with PMH uterine cancer s/p MICHAEL, TIA, CVA with residual right sided weakness, UTI, wheel chair dependent presented with shortness of breath.    Events last 24 hours: ***    PAST MEDICAL & SURGICAL HISTORY:  CVA (cerebrovascular accident)      Uterine cancer      Urinary tract infection      S/P hysterectomy            Medications:    metoprolol tartrate 12.5 milliGRAM(s) Oral every 12 hours      acetaminophen     Tablet .. 650 milliGRAM(s) Oral every 6 hours PRN  melatonin 3 milliGRAM(s) Oral at bedtime PRN  ondansetron Injectable 4 milliGRAM(s) IV Push every 8 hours PRN        aluminum hydroxide/magnesium hydroxide/simethicone Suspension 30 milliLiter(s) Oral every 4 hours PRN  bismuth subsalicylate Liquid 30 milliLiter(s) Oral four times a day PRN  calcium carbonate    500 mG (Tums) Chewable 1 Tablet(s) Chew daily        ascorbic acid 500 milliGRAM(s) Oral daily  cholecalciferol 1000 Unit(s) Oral daily  ferrous    sulfate 325 milliGRAM(s) Oral daily        omega-3-Acid Ethyl Esters 1 Gram(s) Oral <User Schedule>          ICU Vital Signs Last 24 Hrs  T(C): 36.4 (21 Dec 2022 04:38), Max: 36.8 (20 Dec 2022 15:58)  T(F): 97.5 (21 Dec 2022 04:38), Max: 98.3 (20 Dec 2022 15:58)  HR: 60 (21 Dec 2022 08:00) (37 - 116)  BP: 113/44 (21 Dec 2022 08:00) (101/68 - 144/49)  BP(mean): 61 (21 Dec 2022 08:00) (59 - 106)  ABP: --  ABP(mean): --  RR: 19 (21 Dec 2022 08:00) (18 - 29)  SpO2: 88% (21 Dec 2022 08:00) (88% - 100%)    O2 Parameters below as of 20 Dec 2022 18:17  Patient On (Oxygen Delivery Method): nasal cannula  O2 Flow (L/min): 2              I&O's Detail        LABS:                        9.4    13.44 )-----------( 258      ( 21 Dec 2022 06:31 )             30.0     12-21    142  |  105  |  35<H>  ----------------------------<  126<H>  4.1   |  28  |  0.81    Ca    9.1      21 Dec 2022 06:31  Phos  3.6     12-21  Mg     2.4     12-21    TPro  6.7  /  Alb  2.6<L>  /  TBili  0.9  /  DBili  x   /  AST  36  /  ALT  27  /  AlkPhos  117  12-19          CAPILLARY BLOOD GLUCOSE      POCT Blood Glucose.: 77 mg/dL (20 Dec 2022 12:03)    PT/INR - ( 21 Dec 2022 06:31 )   PT: 14.1 sec;   INR: 1.21 ratio         PTT - ( 21 Dec 2022 06:31 )  PTT:31.1 sec    CULTURES:  Culture Results:   No growth to date. (12-19 @ 14:06)  Culture Results:   No growth to date. (12-19 @ 14:06)  Rapid RVP Result: NotDetec (12-19 @ 14:06)      Physical Examination:    General: appears uncomfortable, some accessory muscle use. frail appearing   HEENT: Pupils equal, reactive to light.  Symmetric.  PULM: decreased breath sounds b/l, crackles at bases  NECK: Supple, no lymphadenopathy, trachea midline  CVS: Regular rate and rhythm, no murmurs  ABD: Soft, nondistended, nontender, normoactive bowel sounds  EXT: 2+ pitting edema b/l,   SKIN: Warm and well perfused, no rashes noted.  NEURO: awake but unable to communicate appropriately    DEVICES:     RADIOLOGY: ***    CRITICAL CARE TIME SPENT: ***   Patient is a 89y old  Female who presents with a chief complaint of SOB (21 Dec 2022 10:58)      BRIEF HOSPITAL COURSE: 89 y/o F with PMH uterine cancer s/p MICHAEL, TIA, CVA with residual right sided weakness, UTI, wheel chair dependent presented with shortness of breath.    12/21 pt seen this am, appeared dyspnea. awake but doesnt answer questions    PAST MEDICAL & SURGICAL HISTORY:  CVA (cerebrovascular accident)      Uterine cancer      Urinary tract infection      S/P hysterectomy            Medications:    metoprolol tartrate 12.5 milliGRAM(s) Oral every 12 hours      acetaminophen     Tablet .. 650 milliGRAM(s) Oral every 6 hours PRN  melatonin 3 milliGRAM(s) Oral at bedtime PRN  ondansetron Injectable 4 milliGRAM(s) IV Push every 8 hours PRN        aluminum hydroxide/magnesium hydroxide/simethicone Suspension 30 milliLiter(s) Oral every 4 hours PRN  bismuth subsalicylate Liquid 30 milliLiter(s) Oral four times a day PRN  calcium carbonate    500 mG (Tums) Chewable 1 Tablet(s) Chew daily        ascorbic acid 500 milliGRAM(s) Oral daily  cholecalciferol 1000 Unit(s) Oral daily  ferrous    sulfate 325 milliGRAM(s) Oral daily        omega-3-Acid Ethyl Esters 1 Gram(s) Oral <User Schedule>          ICU Vital Signs Last 24 Hrs  T(C): 36.4 (21 Dec 2022 04:38), Max: 36.8 (20 Dec 2022 15:58)  T(F): 97.5 (21 Dec 2022 04:38), Max: 98.3 (20 Dec 2022 15:58)  HR: 60 (21 Dec 2022 08:00) (37 - 116)  BP: 113/44 (21 Dec 2022 08:00) (101/68 - 144/49)  BP(mean): 61 (21 Dec 2022 08:00) (59 - 106)  ABP: --  ABP(mean): --  RR: 19 (21 Dec 2022 08:00) (18 - 29)  SpO2: 88% (21 Dec 2022 08:00) (88% - 100%)    O2 Parameters below as of 20 Dec 2022 18:17  Patient On (Oxygen Delivery Method): nasal cannula  O2 Flow (L/min): 2              I&O's Detail        LABS:                        9.4    13.44 )-----------( 258      ( 21 Dec 2022 06:31 )             30.0     12-21    142  |  105  |  35<H>  ----------------------------<  126<H>  4.1   |  28  |  0.81    Ca    9.1      21 Dec 2022 06:31  Phos  3.6     12-21  Mg     2.4     12-21    TPro  6.7  /  Alb  2.6<L>  /  TBili  0.9  /  DBili  x   /  AST  36  /  ALT  27  /  AlkPhos  117  12-19          CAPILLARY BLOOD GLUCOSE      POCT Blood Glucose.: 77 mg/dL (20 Dec 2022 12:03)    PT/INR - ( 21 Dec 2022 06:31 )   PT: 14.1 sec;   INR: 1.21 ratio         PTT - ( 21 Dec 2022 06:31 )  PTT:31.1 sec    CULTURES:  Culture Results:   No growth to date. (12-19 @ 14:06)  Culture Results:   No growth to date. (12-19 @ 14:06)  Rapid RVP Result: NotDetec (12-19 @ 14:06)      Physical Examination:    General: appears uncomfortable, some accessory muscle use. frail appearing   HEENT: Pupils equal, reactive to light.  Symmetric.  PULM: decreased breath sounds b/l, crackles at bases  NECK: Supple, no lymphadenopathy, trachea midline  CVS: Regular rate and rhythm, no murmurs  ABD: Soft, nondistended, nontender, normoactive bowel sounds  EXT: 2+ pitting edema b/l,   SKIN: Warm and well perfused, no rashes noted.  NEURO: awake but unable to communicate appropriately    DEVICES:     RADIOLOGY: ***    CRITICAL CARE TIME SPENT: ***

## 2022-12-21 NOTE — DIETITIAN INITIAL EVALUATION ADULT - PERTINENT MEDS FT
MEDICATIONS  (STANDING):  ascorbic acid 500 milliGRAM(s) Oral daily  calcium carbonate    500 mG (Tums) Chewable 1 Tablet(s) Chew daily  cholecalciferol 1000 Unit(s) Oral daily  ferrous    sulfate 325 milliGRAM(s) Oral daily  metoprolol tartrate 12.5 milliGRAM(s) Oral every 12 hours  omega-3-Acid Ethyl Esters 1 Gram(s) Oral <User Schedule>    MEDICATIONS  (PRN):  acetaminophen     Tablet .. 650 milliGRAM(s) Oral every 6 hours PRN Temp greater or equal to 38C (100.4F), Mild Pain (1 - 3)  aluminum hydroxide/magnesium hydroxide/simethicone Suspension 30 milliLiter(s) Oral every 4 hours PRN Dyspepsia  bismuth subsalicylate Liquid 30 milliLiter(s) Oral four times a day PRN Diarrhea  melatonin 3 milliGRAM(s) Oral at bedtime PRN Insomnia  ondansetron Injectable 4 milliGRAM(s) IV Push every 8 hours PRN Nausea and/or Vomiting

## 2022-12-21 NOTE — CONSULT NOTE ADULT - CONVERSATION DETAILS
discussion with pt's son and gigi per the request of the patient  We discussed Palliative Care team being a supportive team when a patient has ongoing illnesses.  We also discussed that it is not an end of life care service, but can help navigate symptoms and emotional support throughout their hospital stay here.  Hospice was explained as well  as an end of life care philosophy.  When a disease cannot be cured, or family/patient decide the treatment burdens out weigh the risk and one choses to change focus of treatment from cure to quality/comfort.     Family explain that pt has had a significant decline at home.  a month ago, pt was able to go from her motorized scooter to the toilet independently, but for the last few weeks that has not been possible. while she remains continent, due to her debility, she has started to need to wear diapers.  family note declining mentation as well -- she used to be fluent in 4 languages now seems to have trouble communicating in [place], her primary language.    Family note that Ms. De La O would want to be home and maintain what little independence she has.  they want home hospice services (GIGI's family has had two people under hospice services in [place]).     Referral to be made to [facility].

## 2022-12-21 NOTE — PROGRESS NOTE ADULT - PROBLEM SELECTOR PLAN 2
Device interrogated this AM with normal function  Low dose Metoprolol 12.5mg BID for rate control initiated today   Palliative care note reviewed family requesting hospice  Will follow PRN

## 2022-12-21 NOTE — DIETITIAN INITIAL EVALUATION ADULT - FACTORS AFF FOOD INTAKE
change in mental status/difficulty feeding self/difficulty with food procurement/preparation/persistent lack of appetite

## 2022-12-21 NOTE — CONSULT NOTE ADULT - ASSESSMENT
A:    89F  HD # 2  FULL CODE    Here for:    1. 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 CHF exacerbation  3. Hypoxic resp failure  4. Anemia  5. Hyperglycemia    This patient requires critical care for support of one or more vital organ systems with a high probability of imminent or life threatening deterioration in his/her condition    P:    Upgrade to CICU  PRN TCP +/- TVP, chronotropic meds  Hold AVNB  AC being refused by Pt son  Maintain Mg > 2, Ph > 3, K > 4  Renal fxn OK, monitor UOP  Lasix     Dispo: Accept to critical care svc in CICU bed. Generator change tomorrow, NPO p MN. PRN TCP +/- TVP, chronotropic agents.     TOTAL CRITICAL CARE TIME:   (EXCLUSIVE of any non bundled procedures)    Note: This time spent INCLUDES time spent directly as this patient's bedside with evaluation, review of chart including review of laboratory and imaging studies, interpretation of vital signs and cardiac output measurements, any necessary ventilator management, and time spent discussing plan of care with patient and family, including goals of care discussion.  
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  Junctional rhythm vs AFib on tele, Pt's son refusing OAC.  - i/r/b/a of PPM pulse generator change with pt's son (HCP) then informed consent obtained  - keep pt NPO post MN for gen change tomorrow  - hold AM dose of lovenox  - may start beta blocker post gen change  - HF Mx as per cardioloy  Plan d/w pt's son//hospitalist/cardiologist
Assessment:     89 y/o female with old stroke admitted for SOB.  PPM battery has been replaced.  family are requesting home hospice services.     Process of Care  --Reviewed dx/treatment problems and alignment with Goals of Care    Physical Aspects of Care  --Pain - Patient denies at this time. c/w current management  --Bowel Regimen -Risk for constipation d/t immobility. Suggest daily dulcolax as needed  --Dyspnea - No SOB at this time. Comfortable and in NAD  --Diastolic CHF - PPM exchanged, which could have been contributing.  cardiology following  --Debility/Weakness, hx CVA - PT as tolerated, OOB to chair, fall precautions.  secondary prevention  --Severe PCM - PO supplements per RD consult     Psychological and Psychiatric Aspects of Care:   --Grief/Bereavement: emotional support provided  --Hx of psychiatric dx: none  -Pastoral Care Available PRN     Social Aspects of Care  -SW involved     Cultural Aspects  -Primary Language: English    Goals of Care: see above    Prognosis: Death can occur at anytime, but if disease continues to progress naturally patient likely has weeks to months.    Pt has Severe PCM, declining functional status (PPS 40-50%) and diastolic heart failure.  she likely has a prognosis of <6 months if disease follows expected course.     Ethical and Legal Aspects: NA  Capacity- questionable.  would involve family in major decisions.     HCP/Surrogate: Son, Bhupendra Vargas    Code Status- DNR/DNI no feeding Tube  MOLST- DNR/DNI no feeding Tube  Dispo Plan- Home Hospice    Discussed With: nursing, SW

## 2022-12-21 NOTE — CONSULT NOTE ADULT - SUBJECTIVE AND OBJECTIVE BOX
HPI: Per Notes:  89 y/o F with PMH uterine cancer s/p MCIHAEL, TIA, CVA with residual right sided weakness, UTI, wheel chair dependent presented with shortness of breath. Pt's son was present at the bedside and stated that the pt has not had any strength the last 2 weeks. She started having difficulty getting up on Saturday and was short of breath.  her breathing worsened. She did complain of chest pain and abdominal pain previously but does not have any today. Reports lower extremity swelling but son says her legs are much improved since wearing compression stockings and have been worse in the past. She does have a history of CVA and has residual right sided weakness, sometimes expressive aphasia and eats a pureed diet. She uses an electric wheelchair to get around. Denies fevers, chills,  N/V, diarrhea/constipation.   [she was] found to have Acute Chf and PPM failure     pt is now s/p PPM exchange.  Seen at bedside, no distress.  no complaints of pain or sob.  She deferred discussion to family.  I spoke with pt's son and separately with daughter in law today by phone regarding their request for hospice services.  see below for details of that discussion.     PAIN: ( )Yes   (x)No  DYSPNEA: ( ) Yes  (x) No    PAST MEDICAL & SURGICAL HISTORY:  CVA (cerebrovascular accident)  Uterine cancer  Urinary tract infection  S/P hysterectomy      SOCIAL HX:    Hx opiate tolerance ( )YES  (x)NO    Baseline ADLs  (Prior to Admission)  ( ) Independent   ( )Dependent     (x) With Assistance    FAMILY HISTORY:  FH: diabetes mellitus (Father)        Review of Systems:    - CONSTITUTIONAL: Denies appetite change, weight loss, fever and chills. Denies weakness and fatigue   - HEENT: Denies changes in vision and hearing.   - RESPIRATORY: Denies SOB, cough and/or respiratory secretions   - CV: Denies palpitations and CP. Denies edema   - GI: Denies abdominal pain, constipation, nausea, vomiting and diarrhea.   - : Denies dysuria and urinary frequency.   - MSK: Denies myalgia and joint pain.   - SKIN: Denies rash and pruritus.   - NEUROLOGICAL: Denies headache and syncope.   - PSYCHIATRIC: Denies confusion, recent changes in mood. Denies anxiety and depression. Denies suicidal ideations    All other systems reviewed and negative      PHYSICAL EXAM:    Vital Signs Last 24 Hrs  T(C): 36.4 (21 Dec 2022 04:38), Max: 36.8 (20 Dec 2022 11:17)  T(F): 97.5 (21 Dec 2022 04:38), Max: 98.3 (20 Dec 2022 15:58)  HR: 60 (21 Dec 2022 08:00) (37 - 116)  BP: 113/44 (21 Dec 2022 08:00) (101/68 - 144/49)  BP(mean): 61 (21 Dec 2022 08:00) (59 - 106)  RR: 19 (21 Dec 2022 08:00) (18 - 29)  SpO2: 88% (21 Dec 2022 08:00) (88% - 100%)    Parameters below as of 20 Dec 2022 18:17  Patient On (Oxygen Delivery Method): nasal cannula  O2 Flow (L/min): 2    Daily Height in cm: 149 (20 Dec 2022 15:58)    Daily Weight in k.4 (21 Dec 2022 04:38)    PPSV2:  40-50 %  FAST: confounded by stroke    - GENERAL: Alert. No acute distress. frail appearing  - EYES: EOMI. Anicteric.   - HENT: Moist mucous membranes.   - LUNGS: Clear to auscultation bilaterally. No accessory muscle use. Speaking few words at a time, but does not appear breathless.   - CARDIOVASCULAR: Regular rate and rhythm. No murmur. No JVD. No edema.   - ABDOMEN: Soft, non-tender and non-distended. No palpable masses. Normal bowel sounds   - EXTREMITIES: No edema. Non-tender.    - SKIN: Warm, no rashes or lesions easily visible.   - NEUROLOGIC: No focal neurological deficits.    - PSYCHIATRIC: Cooperative.      LABS:                        9.4    13.44 )-----------( 258      ( 21 Dec 2022 06:31 )             30.0     12-    142  |  105  |  35<H>  ----------------------------<  126<H>  4.1   |  28  |  0.81    Ca    9.1      21 Dec 2022 06:31  Phos  3.6     12-  Mg     2.4     12-    TPro  6.7  /  Alb  2.6<L>  /  TBili  0.9  /  DBili  x   /  AST  36  /  ALT  27  /  AlkPhos  117  -    PT/INR - ( 21 Dec 2022 06:31 )   PT: 14.1 sec;   INR: 1.21 ratio         PTT - ( 21 Dec 2022 06:31 )  PTT:31.1 sec  Albumin: Albumin, Serum: 2.6 g/dL ( @ 14:06)      Allergies    No Known Allergies    Intolerances      MEDICATIONS  (STANDING):  ascorbic acid 500 milliGRAM(s) Oral daily  calcium carbonate    500 mG (Tums) Chewable 1 Tablet(s) Chew daily  cholecalciferol 1000 Unit(s) Oral daily  ferrous    sulfate 325 milliGRAM(s) Oral daily  furosemide   Injectable 40 milliGRAM(s) IV Push once  metoprolol tartrate 12.5 milliGRAM(s) Oral every 12 hours  omega-3-Acid Ethyl Esters 1 Gram(s) Oral <User Schedule>    MEDICATIONS  (PRN):  acetaminophen     Tablet .. 650 milliGRAM(s) Oral every 6 hours PRN Temp greater or equal to 38C (100.4F), Mild Pain (1 - 3)  aluminum hydroxide/magnesium hydroxide/simethicone Suspension 30 milliLiter(s) Oral every 4 hours PRN Dyspepsia  bismuth subsalicylate Liquid 30 milliLiter(s) Oral four times a day PRN Diarrhea  melatonin 3 milliGRAM(s) Oral at bedtime PRN Insomnia  ondansetron Injectable 4 milliGRAM(s) IV Push every 8 hours PRN Nausea and/or Vomiting      RADIOLOGY/ADDITIONAL STUDIES:

## 2022-12-21 NOTE — DIETITIAN INITIAL EVALUATION ADULT - RD TO REMAIN AVAILABLE
Pt informed/ advised as noted per Kristin.  EKG scheduled for tomorrow.  Pt given number to schedule stress test.  JASPREET Romero RN    
Reason for Call:  Order - Cardiac Stress Test    Detailed comments: Pt returned call regarding his PFT's. Pt is willing to do the Cardiac Stress Test.  Please Order Test and pt wants a call back to schedule.    Phone Number Patient can be reached at: Home number on file [phone] (home)    Best Time: Any Time      Can we leave a detailed message on this number? YES    Call taken on 3/11/2021 at 10:09 AM by Nini Fong      
Stress test ordered  Would like him to come in and get an EKG done before stress test. Order for that placed as well.   Patient is to contact Cardiac Services  at [phone], to schedule his stress test..  Thanks Kristin Alvarez [facility]    
yes

## 2022-12-21 NOTE — PROGRESS NOTE ADULT - PROBLEM SELECTOR PLAN 1
Acute DHF with elevated BNP/LE Edema/vascular congestion on CXR/weight gain;   Echo NL LV FX EF 60-65% Moderate PHTN IV lasix 40 MG given today consider low daily low dose diuretic   Daily weight

## 2022-12-21 NOTE — PROGRESS NOTE ADULT - ASSESSMENT
ASSESSMENT AND PLAN  89yFemale POD#1, s/p pacemaker generator change.  Patient denies any complaints of chest pain, SOB, dizziness, palpitations or any significant discomfort.  Stable for Discharge from EP standpoint.  Device interrogated, normal function.  Postop instructions provided and patient understood.  Patient to follow up in EP clinic in 2 weeks, or sooner with questions and concerns.                 ASSESSMENT AND PLAN  89yFemale POD#1, s/p pacemaker generator change.  Site is without drainage or hematoma.      Device interrogated this AM with normal function  Antibiotics complete  Pressure dressing removed  Labs reviewed  SW note reviewed family requesting hospice/palliative eval  Patient is stable for discharge from EP standpoint  Outpatient EP follow up for wound check in two weeks or sooner for symptoms or concerns  Further management per medicine and palliative   Plan discussed with RN and Dr. Mejia     ASSESSMENT AND PLAN  89yFemale POD#1, s/p pacemaker generator change.  Site is without drainage or hematoma.      Device interrogated this AM with normal function  Antibiotics complete  Pressure dressing removed  Labs reviewed  Will start Metoprolol 12.5mg BID for rate control  SW note reviewed family requesting hospice/palliative eval  Patient is stable for discharge from EP standpoint  Outpatient EP follow up for wound check in two weeks or sooner for symptoms or concerns  Further management per medicine and palliative   Plan discussed with RN and Dr. Mejia

## 2022-12-21 NOTE — DIETITIAN INITIAL EVALUATION ADULT - ORAL INTAKE PTA/DIET HISTORY
Unable to obtain diet hx PTA 2/2 AMS. Lives at home with , has home aide 5 hrs per day. Pt Mal[place]ian speaking only.

## 2022-12-21 NOTE — DIETITIAN INITIAL EVALUATION ADULT - PERTINENT LABORATORY DATA
12-21    142  |  105  |  35<H>  ----------------------------<  126<H>  4.1   |  28  |  0.81    Ca    9.1      21 Dec 2022 06:31  Phos  3.6     12-21  Mg     2.4     12-21    TPro  6.7  /  Alb  2.6<L>  /  TBili  0.9  /  DBili  x   /  AST  36  /  ALT  27  /  AlkPhos  117  12-19

## 2022-12-22 NOTE — PROGRESS NOTE ADULT - NS ATTEND AMEND GEN_ALL_CORE FT
as above   home hospice
Plan of care reviewed. Agree with plan of care. Will follow prn.
s/p PPM generator change 12/20  family requesting home hospice

## 2022-12-22 NOTE — DISCHARGE NOTE PROVIDER - DETAILS OF MALNUTRITION DIAGNOSIS/DIAGNOSES
This patient has been assessed with a concern for Malnutrition and was treated during this hospitalization for the following Nutrition diagnosis/diagnoses:     -  12/21/2022: Severe protein-calorie malnutrition

## 2022-12-22 NOTE — DISCHARGE NOTE NURSING/CASE MANAGEMENT/SOCIAL WORK - NSDCPEFALRISK_GEN_ALL_CORE
For information on Fall & Injury Prevention, visit: [email or web address] OR  [email or web address] OR  https://www.cdc.gov/steadi/patient.html

## 2022-12-22 NOTE — PROGRESS NOTE ADULT - SUBJECTIVE AND OBJECTIVE BOX
Patient is a 89y old  Female who presents with a chief complaint of SOB (21 Dec 2022 10:58)      BRIEF HOSPITAL COURSE: 89 y/o F with PMH uterine cancer s/p MICHAEL, TIA, CVA with residual right sided weakness, UTI, wheel chair dependent presented with shortness of breath.    12/21 pt seen this am, appeared dyspnea. awake but doesnt answer questions  12/22 pt lethargic this am. arousable to voice. nods to answer questions. c/o RUE pain    PAST MEDICAL & SURGICAL HISTORY:  CVA (cerebrovascular accident)  Uterine cancer  Urinary tract infection  S/P hysterectomy    Medications:  metoprolol tartrate 12.5 milliGRAM(s) Oral every 12 hours  acetaminophen     Tablet .. 650 milliGRAM(s) Oral every 6 hours PRN  melatonin 3 milliGRAM(s) Oral at bedtime PRN  ondansetron Injectable 4 milliGRAM(s) IV Push every 8 hours PRN  aluminum hydroxide/magnesium hydroxide/simethicone Suspension 30 milliLiter(s) Oral every 4 hours PRN  bismuth subsalicylate Liquid 30 milliLiter(s) Oral four times a day PRN  calcium carbonate    500 mG (Tums) Chewable 1 Tablet(s) Chew daily  ascorbic acid 500 milliGRAM(s) Oral daily  cholecalciferol 1000 Unit(s) Oral daily  ferrous    sulfate 325 milliGRAM(s) Oral daily  omega-3-Acid Ethyl Esters 1 Gram(s) Oral <User Schedule>    Vital Signs Last 24 Hrs  T(C): 35.9 (22 Dec 2022 08:36), Max: 36.4 (21 Dec 2022 20:01)  T(F): 96.7 (22 Dec 2022 08:36), Max: 97.5 (21 Dec 2022 20:01)  HR: 90 (22 Dec 2022 10:00) (80 - 120)  BP: 110/43 (22 Dec 2022 10:00) (85/37 - 122/51)  BP(mean): 59 (22 Dec 2022 10:00) (49 - 87)  RR: 11 (22 Dec 2022 10:00) (11 - 22)  SpO2: 98% (22 Dec 2022 10:00) (88% - 100%)    LABS:                            9.0    9.23  )-----------( 204      ( 22 Dec 2022 07:58 )             29.8     12-22    142  |  107  |  43<H>  ----------------------------<  111<H>  3.9   |  32<H>  |  0.78    Ca    8.7      22 Dec 2022 07:58  Phos  3.0     12-22  Mg     2.3     12-22      PT/INR - ( 21 Dec 2022 06:31 )   PT: 14.1 sec;   INR: 1.21 ratio         PTT - ( 21 Dec 2022 06:31 )  PTT:31.1 sec      CAPILLARY BLOOD GLUCOSE  POCT Bloodlucose.: 77 mg/dL (20 Dec 2022 12:03)  PT/INR - ( 21 Dec 2022 06:31 )   PT: 14.1 sec;   INR: 1.21 ratio    PTT - ( 21 Dec 2022 06:31 )  PTT:31.1 sec    CULTURES:  Culture Results:   No growth to date. (12-19 @ 14:06)  Culture Results:   No growth to date. (12-19 @ 14:06)  Rapid RVP Result: NotDetec (12-19 @ 14:06)      Physical Examination:    General: appears more comfortable resp wise. no respiratory distress. frail appearing   HEENT: Pupils equal, reactive to light.  Symmetric.  PULM: decreased breath sounds b/l, crackles at bases  NECK: Supple, no lymphadenopathy, trachea midline  CVS: Regular rate and rhythm, no murmurs  ABD: Soft, nondistended, nontender, normoactive bowel sounds  EXT: 2+ pitting edema b/l,   SKIN: Warm and well perfused, no rashes noted.  NEURO: awake but unable to communicate appropriately    DEVICES:   PPM    RADIOLOGY: ***    CRITICAL CARE TIME SPENT: ***

## 2022-12-22 NOTE — PROGRESS NOTE ADULT - ASSESSMENT
Assessment:     89 y/o female with old stroke admitted for SOB.  PPM battery has been replaced.  family are requesting home hospice services.     Process of Care  --Reviewed dx/treatment problems and alignment with Goals of Care    Physical Aspects of Care  --Pain - Patient denies at this time. c/w current management  --Bowel Regimen -Risk for constipation d/t immobility. Suggest daily dulcolax as needed  --Dyspnea - No SOB at this time. Comfortable and in NAD  --Diastolic CHF - PPM exchanged, which could have been contributing.  cardiology following  --Debility/Weakness, hx CVA, wheelchair bound - PT as tolerated, OOB to chair, fall precautions.  secondary prevention  --Severe PCM - PO supplements per RD consult     Psychological and Psychiatric Aspects of Care:   --Grief/Bereavement: emotional support provided  --Hx of psychiatric dx: none  -Pastoral Care Available PRN     Social Aspects of Care  -SW involved     Cultural Aspects  -Primary Language: English    Goals of Care: see above    Prognosis: Death can occur at anytime, but if disease continues to progress naturally patient likely has weeks to months.    Pt has Severe PCM, declining functional status (PPS 40-50%) and diastolic heart failure.  she likely has a prognosis of <6 months if disease follows expected course.     Ethical and Legal Aspects: NA  Capacity- questionable.  would involve family in major decisions.     HCP/Surrogate: SonBhupendra    Code Status- DNR/DNI no feeding Tube  MOLST- DNR/DNI no feeding Tube  Dispo Plan- Home Hospice    Discussed With: nursing, SW   Assessment:     89 y/o female with old stroke admitted for SOB.  PPM battery has been replaced.  family are requesting home hospice services.     Process of Care  --Reviewed dx/treatment problems and alignment with Goals of Care    Physical Aspects of Care  --Pain - suggest changing to non-opiate pain scale -- Oxycodone 5mg PO q6h as needed for severe pain, tylenol for moderate pain.   --Bowel Regimen -Risk for constipation d/t immobility. Suggest daily dulcolax as needed  --Dyspnea - No SOB at this time. Comfortable and in NAD  --Diastolic CHF - PPM exchanged, which could have been contributing.  cardiology following.  on PO Diuresis  --Debility/Weakness, hx CVA, wheelchair bound - PT as tolerated, OOB to chair, fall precautions.  secondary prevention  --Severe PCM - PO supplements per RD consult     Psychological and Psychiatric Aspects of Care:   --Grief/Bereavement: emotional support provided  --Hx of psychiatric dx: none  -Pastoral Care Available PRN     Social Aspects of Care  -SW involved     Cultural Aspects  -Primary Language: English    Goals of Care: see above    Prognosis: Death can occur at anytime, but if disease continues to progress naturally patient likely has weeks to months.    Pt has Severe PCM, declining functional status (PPS 40-50%) and diastolic heart failure.  she likely has a prognosis of <6 months if disease follows expected course.     Ethical and Legal Aspects: NA  Capacity- questionable.  would involve family in major decisions.     HCP/Surrogate: SonBhupendra    Code Status- DNR/DNI no feeding Tube  MOLST- DNR/DNI no feeding Tube  Dispo Plan- Home Hospice.  pending acceptance    Discussed With: nursing, SW

## 2022-12-22 NOTE — DISCHARGE NOTE PROVIDER - NSDCMRMEDTOKEN_GEN_ALL_CORE_FT
calcium carbonate: 1 tab(s) orally once a day, As Needed  Cranberry oral capsule: 3 cap(s) orally once a day  ***pt son says that pt is prone to UTI&#x27;s and he gives 3 separate Cranberry supplements***  Lasix 20 mg oral tablet: 1 tab(s) orally once a day  melatonin 3 mg oral tablet: 1 tab(s) orally once a day (at bedtime), As needed, Insomnia  Metoprolol Tartrate 25 mg oral tablet: 0.5 tab(s) orally 2 times a day   Pepto-Bismol 262 mg/15 mL oral suspension: 30 milliliter(s) orally 4 times a day, As Needed  Tylenol Extra Strength 500 mg oral tablet: 2 tab(s) orally every 6 hours, As Needed

## 2022-12-22 NOTE — DISCHARGE NOTE PROVIDER - CARE PROVIDER_API CALL
Maura Hopper (DO)  [facility]  [address], [place] [address]  Phone: [phone]  Fax: [phone]  Follow Up Time:

## 2022-12-22 NOTE — DISCHARGE NOTE PROVIDER - NSDCFUSCHEDAPPT_GEN_ALL_CORE_FT
[facility] Physician [address]  Scheduled Appointment: 01/06/2023

## 2022-12-22 NOTE — DISCHARGE NOTE PROVIDER - HOSPITAL COURSE
89 y/o F with PMH uterine cancer s/p MICHAEL, TIA, CVA with residual right sided weakness, UTI, wheel chair dependent presented with shortness of breath.    Admitted for Acute hypoxic respiratory failure 2/2 pulm edema, decompensated HF and Bradycardia d/t PPM generator battery malfunction  s/p PPM generator change 12/20  given IV lasix for pulm vascular congestion  Patient with progressive decline as per family. Failure to thrive, protein calorie malnutrition, unable to perform ADLs independently.  Palliative consult appreciated.      Discharge to home with home hospice services

## 2022-12-22 NOTE — PROGRESS NOTE ADULT - ASSESSMENT
ASSESSMENT  89 y/o F with PMH uterine cancer s/p MICHAEL, TIA, CVA with residual right sided weakness, UTI, wheel chair dependent presented with shortness of breath.    Admitted for   1. Acute hypoxic respiratory failure 2/2 pulm edema, decompensated HF  2. Bradycardia d/t PPM generator battery malfunction    s/p PPM generator change 12/20    PLAN  - pain control prn  - family interested in home hospice, referral placed - palliative consulted  - ativan prn, morphine prn  - PO lasix 20 qd for symptom relief as per cardio  - titrate supplemental home O2 as tolerated  - PO diet. nutrition consult appreciated    Dispo: f/u with SW about home hospice referral    Will discuss with Dr. Infante   ASSESSMENT  89 y/o F with PMH uterine cancer s/p MICHAEL, TIA, CVA with residual right sided weakness, UTI, wheel chair dependent presented with shortness of breath.    Admitted for   1. Acute hypoxic respiratory failure 2/2 pulm edema, decompensated HF  2. Bradycardia d/t PPM generator battery malfunction    s/p PPM generator change 12/20    PLAN  - pain control prn  - family interested in home hospice, referral placed - palliative following  - ativan prn, morphine prn  - PO lasix 20 qd for symptom relief as per cardio  - titrate supplemental home O2 as tolerated  - PO diet. nutrition consult appreciated    Dispo: home hospice referral pending acceptance    Will discuss with Dr. Infante

## 2022-12-22 NOTE — PROGRESS NOTE ADULT - SUBJECTIVE AND OBJECTIVE BOX
Subjective:  seen at bedside, pt is sleeping and lethargic.  no overnight events per nursing.     Review of Systems:  Unable to obtain/Limited due to: lethargy        PHYSICAL EXAM:    Vital Signs Last 24 Hrs  T(C): 35.9 (22 Dec 2022 08:36), Max: 36.4 (21 Dec 2022 20:01)  T(F): 96.7 (22 Dec 2022 08:36), Max: 97.5 (21 Dec 2022 20:01)  HR: 93 (22 Dec 2022 06:00) (93 - 125)  BP: 111/51 (22 Dec 2022 05:00) (85/37 - 122/51)  BP(mean): 65 (22 Dec 2022 05:00) (49 - 87)  RR: 18 (22 Dec 2022 06:00) (13 - 23)  SpO2: 100% (22 Dec 2022 06:00) (88% - 100%)      Daily     Daily Weight in k.5 (22 Dec 2022 05:59)      General:  - GENERAL: sleeping, lethargic. No acute distress. frail appaering  - EYES: EOMI. Anicteric.   - HENT: Moist mucous membranes.   - LUNGS: Clear to auscultation bilaterally. No accessory muscle use.   - CARDIOVASCULAR: Regular rate and rhythm. No murmur. No JVD. No edema.   - ABDOMEN: Soft, non-tender and non-distended. No palpable masses. Normal bowel sounds   - EXTREMITIES: No edema. Non-tender.  sarcopenia  - SKIN: Warm, no rashes or lesions on visible skin.       LABS:                        9.0    9.23  )-----------( 204      ( 22 Dec 2022 07:58 )             29.8     12    142  |  107  |  43<H>  ----------------------------<  111<H>  3.9   |  32<H>  |  0.78    Ca    8.7      22 Dec 2022 07:58  Phos  3.0       Mg     2.3     12      PT/INR - ( 21 Dec 2022 06:31 )   PT: 14.1 sec;   INR: 1.21 ratio         PTT - ( 21 Dec 2022 06:31 )  PTT:31.1 sec  Albumin: Albumin, Serum: 2.6 g/dL ( @ 14:06)      Allergies    No Known Allergies    Intolerances      MEDICATIONS  (STANDING):  ascorbic acid 500 milliGRAM(s) Oral daily  calcium carbonate    500 mG (Tums) Chewable 1 Tablet(s) Chew daily  cholecalciferol 1000 Unit(s) Oral daily  ferrous    sulfate 325 milliGRAM(s) Oral daily  furosemide    Tablet 20 milliGRAM(s) Oral daily  metoprolol tartrate 12.5 milliGRAM(s) Oral every 12 hours  omega-3-Acid Ethyl Esters 1 Gram(s) Oral <User Schedule>    MEDICATIONS  (PRN):  acetaminophen     Tablet .. 650 milliGRAM(s) Oral every 6 hours PRN Temp greater or equal to 38C (100.4F), Mild Pain (1 - 3)  aluminum hydroxide/magnesium hydroxide/simethicone Suspension 30 milliLiter(s) Oral every 4 hours PRN Dyspepsia  bismuth subsalicylate Liquid 30 milliLiter(s) Oral four times a day PRN Diarrhea  LORazepam   Injectable 0.25 milliGRAM(s) IV Push every 8 hours PRN Anxiety  melatonin 3 milliGRAM(s) Oral at bedtime PRN Insomnia  morphine  - Injectable 1 milliGRAM(s) IV Push every 6 hours PRN dyspnea, moderate pain  ondansetron Injectable 4 milliGRAM(s) IV Push every 8 hours PRN Nausea and/or Vomiting      RADIOLOGY:   Subjective:  seen at bedside, pt is sleeping and lethargic.  no overnight events per nursing.   pending acceptance to home hospice    Review of Systems:  Unable to obtain/Limited due to: lethargy      PHYSICAL EXAM:    Vital Signs Last 24 Hrs  T(C): 35.9 (22 Dec 2022 08:36), Max: 36.4 (21 Dec 2022 20:01)  T(F): 96.7 (22 Dec 2022 08:36), Max: 97.5 (21 Dec 2022 20:01)  HR: 93 (22 Dec 2022 06:00) (93 - 125)  BP: 111/51 (22 Dec 2022 05:00) (85/37 - 122/51)  BP(mean): 65 (22 Dec 2022 05:00) (49 - 87)  RR: 18 (22 Dec 2022 06:00) (13 - 23)  SpO2: 100% (22 Dec 2022 06:00) (88% - 100%)      Daily     Daily Weight in k.5 (22 Dec 2022 05:59)      General:  - GENERAL: sleeping, lethargic. No acute distress. frail appearing  - EYES: EOMI. Anicteric.   - HENT: Moist mucous membranes.   - LUNGS: Clear to auscultation bilaterally. No accessory muscle use.   - CARDIOVASCULAR: Regular rate and rhythm. No murmur. No JVD. No edema.   - ABDOMEN: Soft, non-tender and non-distended. No palpable masses. Normal bowel sounds   - EXTREMITIES: No edema. Non-tender.  sarcopenia  - SKIN: Warm, no rashes or lesions on visible skin.       LABS:                        9.0    9.23  )-----------( 204      ( 22 Dec 2022 07:58 )             29.8     12    142  |  107  |  43<H>  ----------------------------<  111<H>  3.9   |  32<H>  |  0.78    Ca    8.7      22 Dec 2022 07:58  Phos  3.0     12  Mg     2.3     12-      PT/INR - ( 21 Dec 2022 06:31 )   PT: 14.1 sec;   INR: 1.21 ratio         PTT - ( 21 Dec 2022 06:31 )  PTT:31.1 sec  Albumin: Albumin, Serum: 2.6 g/dL ( @ 14:06)      Allergies    No Known Allergies    Intolerances      MEDICATIONS  (STANDING):  ascorbic acid 500 milliGRAM(s) Oral daily  calcium carbonate    500 mG (Tums) Chewable 1 Tablet(s) Chew daily  cholecalciferol 1000 Unit(s) Oral daily  ferrous    sulfate 325 milliGRAM(s) Oral daily  furosemide    Tablet 20 milliGRAM(s) Oral daily  metoprolol tartrate 12.5 milliGRAM(s) Oral every 12 hours  omega-3-Acid Ethyl Esters 1 Gram(s) Oral <User Schedule>    MEDICATIONS  (PRN):  acetaminophen     Tablet .. 650 milliGRAM(s) Oral every 6 hours PRN Temp greater or equal to 38C (100.4F), Mild Pain (1 - 3)  aluminum hydroxide/magnesium hydroxide/simethicone Suspension 30 milliLiter(s) Oral every 4 hours PRN Dyspepsia  bismuth subsalicylate Liquid 30 milliLiter(s) Oral four times a day PRN Diarrhea  LORazepam   Injectable 0.25 milliGRAM(s) IV Push every 8 hours PRN Anxiety  melatonin 3 milliGRAM(s) Oral at bedtime PRN Insomnia  morphine  - Injectable 1 milliGRAM(s) IV Push every 6 hours PRN dyspnea, moderate pain  ondansetron Injectable 4 milliGRAM(s) IV Push every 8 hours PRN Nausea and/or Vomiting      RADIOLOGY:

## 2022-12-22 NOTE — PROGRESS NOTE ADULT - NUTRITIONAL ASSESSMENT
This patient has been assessed with a concern for Malnutrition and has been determined to have a diagnosis/diagnoses of Severe protein-calorie malnutrition.    This patient is being managed with:   Diet Pureed-  2000mL Fluid Restriction ([id number])  Moderately Thick Liquids (MODTHICKLIQS)  Entered: Dec 21 2022  2:34PM    Diet Pureed-  Moderately Thick Liquids (MODTHICKLIQS)  Entered: Dec 21 2022  2:20PM    The following pending diet order is being considered for treatment of Severe protein-calorie malnutrition:null

## 2022-12-22 NOTE — DISCHARGE NOTE PROVIDER - NSDCCPCAREPLAN_GEN_ALL_CORE_FT
PRINCIPAL DISCHARGE DIAGNOSIS  Diagnosis: Acute CHF  Assessment and Plan of Treatment:       SECONDARY DISCHARGE DIAGNOSES  Diagnosis: Cardiac pacemaker  Assessment and Plan of Treatment: pacemaker generator replaced    
no

## 2022-12-22 NOTE — DISCHARGE NOTE PROVIDER - NSDCCAREPROVSEEN_GEN_ALL_CORE_FT
Valeria, Radha Mario, Blaire Motta, Carisa Mejia, Aldair Díaz, Aldair Posada, Terri Montes, Josep Car, Sherif Infante, Dwight CALDERA

## 2022-12-22 NOTE — DISCHARGE NOTE NURSING/CASE MANAGEMENT/SOCIAL WORK - PATIENT PORTAL LINK FT
You can access the FollowMyHealth Patient Portal offered by [facility] by registering at the following website: [email or web address]. By joining [facility]’s FollowMyHealth portal, you will also be able to view your health information using other applications (apps) compatible with our system.

## 2022-12-22 NOTE — CHART NOTE - NSCHARTNOTEFT_GEN_A_CORE
HPI: As per medical record,   89 y/o F with PMH uterine cancer s/p MICHAEL, TIA, CVA with residual right sided weakness, UTI, wheel chair dependent presented with shortness of breath. Pt's son was present at the bedside and stated that the pt has not had any strength the last 2 weeks. She started having difficulty getting up on Saturday and was short of breath. Sunday her breathing worsened. She did complain of chest pain and abdominal pain previously but does not have any today. Reports lower extremity swelling but son says her legs are much improved since wearing compression stockings and have been worse in the past. She does have a history of CVA and has residual right sided weakness, sometimes expressive aphasia and eats a pureed diet. She uses an electric wheelchair to get around. Denies fevers, chills,  N/V, diarrhea/constipation.  (19 Dec 2022 20:29)      PERTINENT PMH REVIEWED:  [ x ] YES [ ] NO           Primary Contact: son Bhupendra ([phone])    HCP [  ] Surrogate [ x  ] Guardian [   ]  - Pt reportedly deferred to son     Mental Status: Pt was asleep when SW attempted to visit  Concerns of Depression [  ] Unable to assess  Anxiety [   ] Unable to assess  Baseline ADLs (prior to admission):  Independent [ ] moderately [ ] fully   Dependent   [ ] moderately [ x ]fully    Family Meeting attendees: pt's son & DIL participated in discussion with Dr. Montes    Anticipated Grief: Patient[  ] Family [  ]    Caregiver [place] Assessed: Yes [ x ] No [  ]    Adventist: Buddhist    Spiritual Concerns: None reported.  available PRN.     Goals of Care: Comfort [ x ] Rehabilitation [  ] Curative [  ] Life Prolonging [  ]    Previous Services: [facility] aides    ADVANCE DIRECTIVES:  [ x ] YES [ ] NO    - MOLST reflecting DNR/DNI wishes     Anticipated D/C Plan: referral to [facility] home hospice                     Summary: SW spoke with pt's son Bhupendra via telephone (poor connection, call eventually dropped) to offer support. Palliative SW role explained. Bhupendra reports that he has no follow up questions and that he was notified transport has been set up from 3PM for pt to return home with home hospice. Emotional support provided. Our team will continue to follow. HPI: As per medical record,   89 y/o F with PMH uterine cancer s/p MICHAEL, TIA, CVA with residual right sided weakness, UTI, wheel chair dependent presented with shortness of breath. Pt's son was present at the bedside and stated that the pt has not had any strength the last 2 weeks. She started having difficulty getting up on Saturday and was short of breath. Sunday her breathing worsened. She did complain of chest pain and abdominal pain previously but does not have any today. Reports lower extremity swelling but son says her legs are much improved since wearing compression stockings and have been worse in the past. She does have a history of CVA and has residual right sided weakness, sometimes expressive aphasia and eats a pureed diet. She uses an electric wheelchair to get around. Denies fevers, chills,  N/V, diarrhea/constipation.  (19 Dec 2022 20:29)      PERTINENT PMH REVIEWED:  [ x ] YES [ ] NO           Primary Contact: son Bhupendra ([phone])    HCP [  ] Surrogate [ x  ] Guardian [   ]  - Pt reportedly deferred to son     Mental Status: Pt was asleep when SW attempted to visit  Concerns of Depression [  ] Unable to assess  Anxiety [   ] Unable to assess  Baseline ADLs (prior to admission):  Independent [ ] moderately [ ] fully   Dependent   [ ] moderately [ x ]fully    Family Meeting attendees: pt's son & DIL participated in discussion with Dr. Montes    Anticipated Grief: Patient[  ] Family [  ]    Caregiver [place] Assessed: Yes [ x ] No [  ]    Zoroastrian: Amish    Spiritual Concerns: None reported.  available PRN.     Goals of Care: Comfort [ x ] Rehabilitation [  ] Curative [  ] Life Prolonging [  ]    Previous Services: [facility] aides    ADVANCE DIRECTIVES:  [ x ] YES [ ] NO    - MOLST reflecting DNR/DNI wishes     Anticipated D/C Plan: referral to [facility] home hospice                     Summary: SW attempted to meet with pt but she appeared to be sleeping comfortably. SW spoke with pt's son Bhupendra via telephone to offer support. Palliative SW role explained. Bhupendra reports that he has no follow up questions and that he was notified transport has been set up from 3PM for pt to return home with home hospice. Emotional support provided. Our team will continue to follow.

## 2023-01-01 ENCOUNTER — APPOINTMENT (OUTPATIENT)
Dept: ELECTROPHYSIOLOGY | Facility: CLINIC | Age: 88
End: 2023-01-01

## 2023-04-06 ENCOUNTER — APPOINTMENT (OUTPATIENT)
Dept: ELECTROPHYSIOLOGY | Facility: CLINIC | Age: 88
End: 2023-04-06

## 2023-07-06 NOTE — ED PROVIDER NOTE - NSFOLLOWUPINSTRUCTIONS_ED_ALL_ED_FT
Male Urinary Tract Infection in Older Adults    WHAT YOU NEED TO KNOW:    A urinary tract infection (UTI) is caused by bacteria that get inside your urinary tract. Your urinary tract includes your kidneys, ureters, bladder, and urethra. Urine is made in your kidneys, and it flows from the ureters to the bladder. Urine leaves the bladder through the urethra. A UTI is more common in your lower urinary tract, which includes your bladder and urethra.    Kidney, Ureters, Bladder         DISCHARGE INSTRUCTIONS:    Return to the emergency department if:   •You are urinating very little or not at all.      •You are vomiting.      •You have a high fever with shaking chills.       •You have side or back pain that gets worse.      Call your doctor if:   •You have a fever.      •You are a woman and you have increased white or yellow discharge from your vagina.      •You do not feel better after 2 days of taking antibiotics.      •You have questions or concerns about your condition or care.      Medicines:   •Medicines help treat the bacterial infection or decrease pain and burning when you urinate. You may also need medicines to decrease the urge to urinate often. If you have UTIs often (called recurrent UTIs), you may be given antibiotics to take regularly. You will be given directions for when and how to use antibiotics. The goal is to prevent UTIs but not cause antibiotic resistance by using antibiotics too often.      •Take your medicine as directed. Contact your healthcare provider if you think your medicine is not helping or if you have side effects. Tell him or her if you are allergic to any medicine. Keep a list of the medicines, vitamins, and herbs you take. Include the amounts, and when and why you take them. Bring the list or the pill bottles to follow-up visits. Carry your medicine list with you in case of an emergency.      Self-care:   •Drink liquids as directed. Liquids can help flush bacteria from your urinary tract. Ask how much liquid to drink each day and which liquids are best for you. You may need to drink more liquids than usual to help flush out the bacteria. Do not drink alcohol, caffeine, and citrus juices. These can irritate your bladder and increase your symptoms.      •Apply heat on your abdomen for 20 to 30 minutes every 2 hours for as many days as directed. Heat helps decrease discomfort and pressure in your bladder.      Prevent a UTI:   •Urinate when you feel the urge. Do not hold your urine. Bacteria can grow if urine stays in the bladder too long. It may be helpful to urinate at least every 3 to 4 hours.      •Urinate after you have sex to flush away bacteria that can enter your urinary tract during sex.      •Wear cotton underwear and clothes that are loose. Tight pants and nylon underwear can trap moisture and cause bacteria to grow.      •Cranberry juice or cranberry supplements may help prevent UTIs. Your healthcare provider can recommend the right juice or supplement for you.      •Women should wipe front to back after urinating or having a bowel movement. This may prevent germs from getting into the urinary tract. Do not douche or use feminine deodorants. These can change the chemical balance in your vagina. You may also be given vaginal estrogen medicine. This medicine helps prevent recurrent UTIs in women who have gone through menopause or are in georgina-menopause.      Follow up with your healthcare provider as directed: Write down your questions so you remember to ask them during your visits.       Hematuria    WHAT YOU NEED TO KNOW:    Hematuria is blood in your urine. Your urine may be bright red to dark brown.    DISCHARGE INSTRUCTIONS:    Return to the emergency department if:   •You have blood in your urine after a new injury, such as a fall.      •You have severe back or side pain that does not go away with treatment.      Call your doctor if:   •You are urinating very small amounts or not at all.      •You feel like you cannot empty your bladder.      •You have a fever that gets worse or does not go away with treatment.      •You cannot keep liquids or medicines down.      •Your urine gets darker, even after you drink extra liquids.      •You have questions or concerns about your condition, treatment, or care.      Drink liquids as directed: You may need to drink extra liquids to help flush the blood from your body through your urine. Water is the best liquid to drink. Ask how much liquid to drink each day and which liquids are best for you.    Follow up with your doctor as directed: Write down your questions so you remember to ask them during your visits.

## 2023-07-11 NOTE — ED ADULT NURSE REASSESSMENT NOTE - NS ED NURSE REASSESS COMMENT FT1
pt d/c in stable condition, no apparent distress noted at this time. pt A&Ox3. pt able to ambulate with steady gait. pt in no distress at d/c. Patient seen and examined;  Repeat MRI pending   Also CT of chest and abdomen looking for metastatic disease   Message left with daughter regarding plans   Physical therapy Patient seen and examined;  Repeat MRI pending   Also CT of chest and abdomen looking for metastatic disease   Message left with daughter regarding plans   Physical therapy  Oncology to see; Spoke with them yesterday

## 2023-08-24 NOTE — ED PROVIDER NOTE - PRINCIPAL DIAGNOSIS
Christy Odell (:  1930) is a 80 y.o. female,Established patient, here for evaluation of the following chief complaint(s):  New Patient         ASSESSMENT/PLAN:  1. Chronic bilateral low back pain without sciatica  2. Sjogren's syndrome with keratoconjunctivitis sicca ([address])  3. Primary hypertension  4. Recurrent major depressive disorder, in full remission ([address])  5. Hypothyroidism, unspecified type  -     TSH; Future  -     T4, Free; Future      No follow-ups on file. Since she has been on tramadol for quite some time, it provides pain relief with no concerning side effects, will continue; not yet due for a refill     Subjective   SUBJECTIVE/OBJECTIVE:  HPI    Patient is a 81 y/o F with a PMHx of Sjogren's, depression, dementia, GERD, hypothyroidism and chronic back pain who presents to establish care. Chronic back pain- has been on tramadol 50 mg TID and this helps her function; she also uses topical creams as well; if she does not have the medication, her back is very painful; will try not to use the third tramadol pill; she sometimes will get constipation and will take a stool softner; she has been on tramadol for quite some time    She is primarily wheel chair bound now; she denies any falls recently; she lives with her daughter and is able to perform many ADLs    She does have some incontinence at night and wears depends at night     Hypothyroidism- on synthroid 100 mcg and had this adjusted in January but has not had labs drawn since to check levels    GERD- on PPI and famotidine    HTN- on losartan and amlodipine and BP well controlled    Depression- she is on sertraline 25 mg; she had been on 50 mg in the past and felt this was too much for her     She follows with dermatology    She has a history of raynaud's and often wears gloves, even in summer     Review of Systems   Constitutional: Negative. HENT:  Positive for hearing loss (has hearing aides). Eyes: Negative.     Respiratory: Acute CHF

## 2024-10-02 NOTE — ED PROVIDER NOTE - MUSCULOSKELETAL NECK EXAM
[Time Spent: ___ minutes] : I have spent [unfilled] minutes of time on the encounter which excludes teaching and separately reported services.
no deformity, pain or tenderness. no restriction of movement

## 2025-04-16 NOTE — ED PROVIDER NOTE - DISCHARGE DATE
Before Surgery/Procedure Hold (Do Not Take) these Medications  Medications and Supplements:  - Morning of surgery hold any Vitamins AND for 2 weeks prior hold any Vitamin E or Herbals     Anticoagulation:    - 7 days before high bleeding risk surgery hold (ASPIRIN PO [[id number]])    Antidiabetic: none on med list    LATIA Risk (Diuretics, ACE-I/ARB, NSAIDs): none on med list      Continue all other medications unless an alternative plan was advised with the surgeon and/or specialist.   Patient arrived with mom. Ht/wt, VS, assessment obtained. Medical history reviewed. PIV placed and Pre-hydration started. Patient brought down to nuc-med for scan. IV lasix pushed over 2 minutes.  Once scan complete, patient brought back to [facility] and post-hy 31-Mar-2022